# Patient Record
Sex: FEMALE | Race: WHITE | HISPANIC OR LATINO | Employment: FULL TIME | URBAN - METROPOLITAN AREA
[De-identification: names, ages, dates, MRNs, and addresses within clinical notes are randomized per-mention and may not be internally consistent; named-entity substitution may affect disease eponyms.]

---

## 2021-04-02 ENCOUNTER — OFFICE VISIT (OUTPATIENT)
Dept: URGENT CARE | Facility: CLINIC | Age: 21
End: 2021-04-02
Payer: MEDICAID

## 2021-04-02 VITALS
SYSTOLIC BLOOD PRESSURE: 108 MMHG | DIASTOLIC BLOOD PRESSURE: 74 MMHG | WEIGHT: 150 LBS | HEART RATE: 93 BPM | OXYGEN SATURATION: 98 % | BODY MASS INDEX: 27.6 KG/M2 | TEMPERATURE: 98 F | HEIGHT: 62 IN

## 2021-04-02 DIAGNOSIS — R59.1 LYMPHADENOPATHY: ICD-10-CM

## 2021-04-02 DIAGNOSIS — J02.9 SORE THROAT: Primary | ICD-10-CM

## 2021-04-02 LAB
CTP QC/QA: YES
S PYO RRNA THROAT QL PROBE: NEGATIVE

## 2021-04-02 PROCEDURE — 99204 PR OFFICE/OUTPT VISIT, NEW, LEVL IV, 45-59 MIN: ICD-10-PCS | Mod: S$GLB,,, | Performed by: EMERGENCY MEDICINE

## 2021-04-02 PROCEDURE — 87880 STREP A ASSAY W/OPTIC: CPT | Mod: QW,,, | Performed by: NURSE PRACTITIONER

## 2021-04-02 PROCEDURE — 87880 POCT RAPID STREP A: ICD-10-PCS | Mod: QW,,, | Performed by: NURSE PRACTITIONER

## 2021-04-02 PROCEDURE — 99204 OFFICE O/P NEW MOD 45 MIN: CPT | Mod: S$GLB,,, | Performed by: EMERGENCY MEDICINE

## 2021-04-02 RX ORDER — AMOXICILLIN AND CLAVULANATE POTASSIUM 875; 125 MG/1; MG/1
1 TABLET, FILM COATED ORAL EVERY 12 HOURS
Qty: 20 TABLET | Refills: 0 | Status: SHIPPED | OUTPATIENT
Start: 2021-04-02 | End: 2021-04-12

## 2022-01-19 ENCOUNTER — OFFICE VISIT (OUTPATIENT)
Dept: URGENT CARE | Facility: CLINIC | Age: 22
End: 2022-01-19
Payer: MEDICAID

## 2022-01-19 VITALS
TEMPERATURE: 98 F | HEIGHT: 62 IN | SYSTOLIC BLOOD PRESSURE: 109 MMHG | DIASTOLIC BLOOD PRESSURE: 76 MMHG | BODY MASS INDEX: 24.66 KG/M2 | HEART RATE: 79 BPM | WEIGHT: 134 LBS | RESPIRATION RATE: 16 BRPM | OXYGEN SATURATION: 98 %

## 2022-01-19 DIAGNOSIS — U07.1 COVID-19: ICD-10-CM

## 2022-01-19 DIAGNOSIS — J02.9 SORE THROAT: Primary | ICD-10-CM

## 2022-01-19 LAB
CTP QC/QA: YES
CTP QC/QA: YES
S PYO RRNA THROAT QL PROBE: NEGATIVE
SARS-COV-2 AG RESP QL IA.RAPID: POSITIVE

## 2022-01-19 PROCEDURE — 1159F PR MEDICATION LIST DOCUMENTED IN MEDICAL RECORD: ICD-10-PCS | Mod: CPTII,S$GLB,, | Performed by: STUDENT IN AN ORGANIZED HEALTH CARE EDUCATION/TRAINING PROGRAM

## 2022-01-19 PROCEDURE — 87811 SARS CORONAVIRUS 2 ANTIGEN POCT, MANUAL READ: ICD-10-PCS | Mod: S$GLB,,, | Performed by: STUDENT IN AN ORGANIZED HEALTH CARE EDUCATION/TRAINING PROGRAM

## 2022-01-19 PROCEDURE — 87880 STREP A ASSAY W/OPTIC: CPT | Mod: QW,,, | Performed by: STUDENT IN AN ORGANIZED HEALTH CARE EDUCATION/TRAINING PROGRAM

## 2022-01-19 PROCEDURE — 1159F MED LIST DOCD IN RCRD: CPT | Mod: CPTII,S$GLB,, | Performed by: STUDENT IN AN ORGANIZED HEALTH CARE EDUCATION/TRAINING PROGRAM

## 2022-01-19 PROCEDURE — 87880 POCT RAPID STREP A: ICD-10-PCS | Mod: QW,,, | Performed by: STUDENT IN AN ORGANIZED HEALTH CARE EDUCATION/TRAINING PROGRAM

## 2022-01-19 PROCEDURE — 3074F SYST BP LT 130 MM HG: CPT | Mod: CPTII,S$GLB,, | Performed by: STUDENT IN AN ORGANIZED HEALTH CARE EDUCATION/TRAINING PROGRAM

## 2022-01-19 PROCEDURE — 3074F PR MOST RECENT SYSTOLIC BLOOD PRESSURE < 130 MM HG: ICD-10-PCS | Mod: CPTII,S$GLB,, | Performed by: STUDENT IN AN ORGANIZED HEALTH CARE EDUCATION/TRAINING PROGRAM

## 2022-01-19 PROCEDURE — 99214 PR OFFICE/OUTPT VISIT, EST, LEVL IV, 30-39 MIN: ICD-10-PCS | Mod: S$GLB,,, | Performed by: STUDENT IN AN ORGANIZED HEALTH CARE EDUCATION/TRAINING PROGRAM

## 2022-01-19 PROCEDURE — 1160F RVW MEDS BY RX/DR IN RCRD: CPT | Mod: CPTII,S$GLB,, | Performed by: STUDENT IN AN ORGANIZED HEALTH CARE EDUCATION/TRAINING PROGRAM

## 2022-01-19 PROCEDURE — 87811 SARS-COV-2 COVID19 W/OPTIC: CPT | Mod: S$GLB,,, | Performed by: STUDENT IN AN ORGANIZED HEALTH CARE EDUCATION/TRAINING PROGRAM

## 2022-01-19 PROCEDURE — 3008F BODY MASS INDEX DOCD: CPT | Mod: CPTII,S$GLB,, | Performed by: STUDENT IN AN ORGANIZED HEALTH CARE EDUCATION/TRAINING PROGRAM

## 2022-01-19 PROCEDURE — 3078F PR MOST RECENT DIASTOLIC BLOOD PRESSURE < 80 MM HG: ICD-10-PCS | Mod: CPTII,S$GLB,, | Performed by: STUDENT IN AN ORGANIZED HEALTH CARE EDUCATION/TRAINING PROGRAM

## 2022-01-19 PROCEDURE — 3078F DIAST BP <80 MM HG: CPT | Mod: CPTII,S$GLB,, | Performed by: STUDENT IN AN ORGANIZED HEALTH CARE EDUCATION/TRAINING PROGRAM

## 2022-01-19 PROCEDURE — 1160F PR REVIEW ALL MEDS BY PRESCRIBER/CLIN PHARMACIST DOCUMENTED: ICD-10-PCS | Mod: CPTII,S$GLB,, | Performed by: STUDENT IN AN ORGANIZED HEALTH CARE EDUCATION/TRAINING PROGRAM

## 2022-01-19 PROCEDURE — 99214 OFFICE O/P EST MOD 30 MIN: CPT | Mod: S$GLB,,, | Performed by: STUDENT IN AN ORGANIZED HEALTH CARE EDUCATION/TRAINING PROGRAM

## 2022-01-19 PROCEDURE — 3008F PR BODY MASS INDEX (BMI) DOCUMENTED: ICD-10-PCS | Mod: CPTII,S$GLB,, | Performed by: STUDENT IN AN ORGANIZED HEALTH CARE EDUCATION/TRAINING PROGRAM

## 2022-01-19 RX ORDER — DEXTROMETHORPHAN HYDROBROMIDE, GUAIFENESIN, PHENYLEPHRINE HYDROCHLORIDE 17.5; 400; 1 MG/1; MG/1; MG/1
1 TABLET ORAL
Qty: 42 TABLET | Refills: 0 | Status: SHIPPED | OUTPATIENT
Start: 2022-01-19 | End: 2023-08-08 | Stop reason: CLARIF

## 2022-01-19 RX ORDER — BENZOCAINE/MENTHOL 15 MG-10MG
1 LOZENGE MUCOUS MEMBRANE
Qty: 60 LOZENGE | Refills: 0 | Status: SHIPPED | OUTPATIENT
Start: 2022-01-19 | End: 2023-08-08

## 2022-01-19 NOTE — PATIENT INSTRUCTIONS
Patient Education       COVID-19 Discharge Instructions   About this topic   Coronavirus disease 2019 is also known as COVID-19. It is a viral illness that infects the lungs. It is caused by a virus called SARS-associated coronavirus (SARS-CoV-2).  The signs of COVID-19 most often start a few days after you have been infected. In some people, it takes longer to show signs. Others never show signs of the infection. You may have a cough, fever, shaking chills and it may be hard to breathe. You may be very tired, have muscle aches, a headache or sore throat. Some people have an upset stomach or loose stools. Others lose their sense of smell or taste. You may not have these signs all the time and they may come and go while you are sick.  The virus spreads easily through droplets when you talk, sneeze, or cough. You can pass the virus to others when you are talking close together, singing, hugging, sharing food, or shaking hands. Doctors believe the germs also survive on surfaces like tables, door handles, and telephones. However, this is not a common way that COVID-19 spreads. Doctors believe you can also spread the infection even if you dont have any symptoms, but they do not know how that happens. This is why getting vaccinated is one of the best ways to keep you healthy and slow the spread of the virus.  Some people have a mild case of COVID-19 and are able to stay at home and away from others until they feel better. Others may need to be in the hospital if they are very sick. Some people with COVID-19 can have some symptoms for weeks or months. People with COVID-19 must isolate themselves. You can start to be around others when your doctor says it is safe to do so.       What care is needed at home?   · Ask your doctor what you need to do when you go home. Make sure you ask questions if you do not understand what the doctor says.  · Drink lots of water, juice, or broth to replace fluids lost from a fever.  · You  may use cool mist humidifiers to help ease congestion and coughing.  · Use 2 to 3 pillows to prop yourself up when you lie down to make it easier to breathe and sleep.  · Do not smoke and do not drink beer, wine, and mixed drinks (alcohol).  · To lower the chance of passing the infection to others, get a COVID-19 vaccine after your infection has resolved.  · If you have not been fully vaccinated:  ? Wear a mask over your mouth and nose if you are around others who are not sick. Cloth masks work best if they have more than one layer of fabric.  ? Wash your hands often.  ? Stay home in a separate room, if possible, away from others. Only go out to get medical care.  ? Use a separate bathroom if possible.  ? Do not make food for others.  What follow-up care is needed?   · Your doctor may ask you to make visits to the office to check on your progress. Be sure to keep these visits. Make sure you wear a mask at these visits.  · If you can, tell the staff you have COVID-19 ahead of time so they can take extra care to stop the disease from spreading.  · It may take a few weeks before your health returns to normal.  What drugs may be needed?   The doctor may order drugs to:  · Help with breathing  · Help with fever  · Help with swelling in your airways and lungs  · Control coughing  · Ease a sore throat  · Help a runny or stuffy nose  Will physical activity be limited?   You may have to limit your physical activity. Talk to your doctor about the right amount of activity for you. If you have been very sick with COVID-19, it can take some time to get your strength back.  Will there be any other care needed?   Doctors do not know how long you can pass the virus on to others after you are sick. This is why it is important to stay in a separate room, if possible, when you are sick. For now, doctors are giving general guidelines for you to follow after you have been sick. Before you go around other people, you should:  · Be fever  free for 24 hours without taking any drugs to lower the fever  · Have no symptoms of cough or shortness of breath  · Wait at least 10 days after first having symptoms or your first positive test, and you need to be symptom free as above. Some experts suggest waiting 20 days if you have had a more severe infection.  Talk with your doctor about getting a COVID-19 vaccine.  What problems could happen?   · Fluid loss. This is dehydration.  · Short-term or long-term lung damage  · Heart problems  · Death  When do I need to call the doctor?   · You are having so much trouble breathing that you can only say one or two words at a time.  · You need to sit upright at all times to be able to breathe and/or cannot lie down.  · You are very confused or cannot stay awake.  · Your lips or skin start to turn blue or grey.  · You think you might be having a medical emergency. Some examples of medical emergencies are:  ? Severe chest pain.  ? Not able to speak or move normally.  · You have trouble breathing when talking or sitting still.  · You have new shortness of breath.  · You become weak or dizzy.  · You have very dark urine or do not pass urine for more than 8 hours.  · You have new or worsening COVID-19 symptoms like:  ? Fever  ? Cough  ? Feeling very tired  ? Shaking chills  ? Headache  ? Trouble swallowing  ? Throwing up  ? Loose stools  ? Reddish purple spots on your fingers or toes  Teach Back: Helping You Understand   The Teach Back Method helps you understand the information we are giving you. After you talk with the staff, tell them in your own words what you learned. This helps to make sure the staff has described each thing clearly. It also helps to explain things that may have been confusing. Before going home, make sure you can do these:  · I can tell you about my condition.  · I can tell you what may help ease my breathing.  · I can tell you what I can do to help avoid passing the infection to others.  · I can tell  you what I will do if I have trouble breathing; feel sleepy or confused; or my fingertips, fingernails, skin, or lips are blue.  Where can I learn more?   Centers for Disease Control and Prevention  https://www.cdc.gov/coronavirus/2019-ncov/about/index.html   Centers for Disease Control and Prevention  https://www.cdc.gov/coronavirus/2019-ncov/hcp/disposition-in-home-patients.html   World Health Organization  https://www.who.int/news-room/q-a-detail/o-g-womjrcwatmgzf   Last Reviewed Date   2021-10-05  Consumer Information Use and Disclaimer   This information is not specific medical advice and does not replace information you receive from your health care provider. This is only a brief summary of general information. It does NOT include all information about conditions, illnesses, injuries, tests, procedures, treatments, therapies, discharge instructions or life-style choices that may apply to you. You must talk with your health care provider for complete information about your health and treatment options. This information should not be used to decide whether or not to accept your health care providers advice, instructions or recommendations. Only your health care provider has the knowledge and training to provide advice that is right for you.  Copyright   Copyright © 2021 [employer] and its affiliates and/or licensors. All rights reserved.

## 2022-01-19 NOTE — PROGRESS NOTES
"Subjective:       Patient ID: Smita Nguyen is a 21 y.o. female.    Vitals:  height is 5' 2" (1.575 m) and weight is 60.8 kg (134 lb). Her temperature is 98.4 °F (36.9 °C). Her blood pressure is 109/76 and her pulse is 79. Her respiration is 16 and oxygen saturation is 98%.     Chief Complaint: Sore Throat    Patient is a 21-year-old female who presents to clinic for COVID testing.  Patient states she is not vaccinated for COVID.  Patient unsure of COVID exposure.  Patient reports symptoms times approximately 2 days.  Patient states she is experiencing fatigue, fever with a temperature max of 100.0 F, nasal sinus congestion, sore throat, and generalized headaches.  Patient denies any acute dizziness, generalized body aches, chest pain or palpitations, shortness of breath or cough, abdominal pain, nausea or vomiting, or any diarrhea.  Patient states has taken over-the-counter medications with some relief of symptoms.    Sore Throat   This is a new problem. The current episode started in the past 7 days. The problem has been gradually worsening. Neither side of throat is experiencing more pain than the other. There has been no fever. Associated symptoms include congestion and headaches. Pertinent negatives include no abdominal pain, coughing, diarrhea, ear pain, shortness of breath, trouble swallowing or vomiting.       Constitution: Positive for fatigue and fever (Temperature max 100.0 F).   HENT: Positive for congestion and sore throat. Negative for ear pain and trouble swallowing.    Neck: neck negative.   Cardiovascular: Negative.  Negative for chest pain and palpitations.   Eyes: Negative.    Respiratory: Negative.  Negative for chest tightness, cough and shortness of breath.    Gastrointestinal: Negative.  Negative for abdominal pain, nausea, vomiting and diarrhea.   Endocrine: negative.   Genitourinary: Negative.    Musculoskeletal: Negative.  Negative for muscle ache.   Skin: Negative.  Negative for color " change, pale, rash and erythema.   Allergic/Immunologic: Negative.    Neurological: Positive for headaches. Negative for dizziness, light-headedness, passing out, disorientation and altered mental status.   Hematologic/Lymphatic: Negative.    Psychiatric/Behavioral: Negative.  Negative for altered mental status, disorientation and confusion.       Objective:      Physical Exam   Constitutional: She is oriented to person, place, and time. She appears well-developed and well-nourished. She is cooperative.  Non-toxic appearance. She does not appear ill. No distress.   HENT:   Head: Normocephalic and atraumatic.   Ears:   Right Ear: Hearing, tympanic membrane, external ear and ear canal normal.   Left Ear: Hearing, tympanic membrane, external ear and ear canal normal.   Nose: Congestion present. No mucosal edema, rhinorrhea or nasal deformity. No epistaxis. Right sinus exhibits no maxillary sinus tenderness and no frontal sinus tenderness. Left sinus exhibits no maxillary sinus tenderness and no frontal sinus tenderness.   Mouth/Throat: Uvula is midline and mucous membranes are normal. Mucous membranes are moist. No trismus in the jaw. Normal dentition. No uvula swelling. Posterior oropharyngeal erythema present. No oropharyngeal exudate. Oropharynx is clear.   Eyes: Conjunctivae and lids are normal. Pupils are equal, round, and reactive to light. Right eye exhibits no discharge. Left eye exhibits no discharge. No scleral icterus.   Neck: Trachea normal and phonation normal. Neck supple. No neck rigidity present.   Cardiovascular: Normal rate, regular rhythm, normal heart sounds, intact distal pulses and normal pulses.   Pulmonary/Chest: Effort normal and breath sounds normal. No respiratory distress. She has no wheezes. She has no rhonchi. She has no rales.   Abdominal: Normal appearance and bowel sounds are normal. She exhibits no distension. Soft. There is no abdominal tenderness.   Musculoskeletal: Normal range of  motion.         General: No deformity or edema. Normal range of motion.      Cervical back: She exhibits no tenderness.   Lymphadenopathy:     She has no cervical adenopathy.   Neurological: She is alert and oriented to person, place, and time. She exhibits normal muscle tone. Coordination normal.   Skin: Skin is warm, dry, intact, not diaphoretic, not pale and no rash. Capillary refill takes less than 2 seconds. No erythema   Psychiatric: She has a normal mood and affect. Her speech is normal and behavior is normal. Judgment and thought content normal.   Nursing note and vitals reviewed.        Assessment:       1. Sore throat    2. COVID-19          Plan:         Sore throat  -     POCT rapid strep A  -     SARS Coronavirus 2 Antigen, POCT Manual Read    COVID-19    Other orders  -     phenylephrine-DM-guaiFENesin (DECONEX DMX) 10-17.5-400 mg Tab; Take 1 tablet by mouth every 4 to 6 hours as needed (Congestion).  Dispense: 42 tablet; Refill: 0  -     benzocaine-menthoL (CHLORASEPTIC MAX) 15-10 mg Lozg; 1 lozenge by Mucous Membrane route every 2 (two) hours as needed (Sore throat).  Dispense: 60 lozenge; Refill: 0                 Labs:  COVID positive.  Rapid strep negative.  Quarantine as directed.  COVID recommendations provided.  Take medications as prescribed.  Follow-up with PCP in 1-2 days.  Return to clinic as needed.  To ED for any new or acutely worsening symptoms.  Patient in agreement with plan of care.

## 2023-08-05 ENCOUNTER — HOSPITAL ENCOUNTER (EMERGENCY)
Facility: HOSPITAL | Age: 23
Discharge: HOME | End: 2023-08-05
Attending: EMERGENCY MEDICINE
Payer: MEDICAID

## 2023-08-05 VITALS
RESPIRATION RATE: 16 BRPM | SYSTOLIC BLOOD PRESSURE: 121 MMHG | DIASTOLIC BLOOD PRESSURE: 63 MMHG | OXYGEN SATURATION: 100 % | HEART RATE: 76 BPM | TEMPERATURE: 98 F | HEIGHT: 62 IN | WEIGHT: 165 LBS | BODY MASS INDEX: 30.36 KG/M2

## 2023-08-05 DIAGNOSIS — N20.1 URETEROLITHIASIS: Primary | ICD-10-CM

## 2023-08-05 DIAGNOSIS — N23 RENAL COLIC: ICD-10-CM

## 2023-08-05 LAB
B-HCG UR QL: NEGATIVE
BACTERIA #/AREA URNS HPF: ABNORMAL /HPF
BILIRUB UR QL STRIP: NEGATIVE
CLARITY UR: CLEAR
COLOR UR: YELLOW
GLUCOSE UR QL STRIP: NEGATIVE
HGB UR QL STRIP: ABNORMAL
HYALINE CASTS #/AREA URNS LPF: 0 /LPF
KETONES UR QL STRIP: NEGATIVE
LEUKOCYTE ESTERASE UR QL STRIP: ABNORMAL
MICROSCOPIC COMMENT: ABNORMAL
NITRITE UR QL STRIP: NEGATIVE
PH UR STRIP: 6 [PH] (ref 5–8)
PROT UR QL STRIP: ABNORMAL
RBC #/AREA URNS HPF: 7 /HPF (ref 0–4)
SP GR UR STRIP: >=1.03 (ref 1–1.03)
SQUAMOUS #/AREA URNS HPF: 2 /HPF
URN SPEC COLLECT METH UR: ABNORMAL
UROBILINOGEN UR STRIP-ACNC: NEGATIVE EU/DL
WBC #/AREA URNS HPF: 2 /HPF (ref 0–5)

## 2023-08-05 PROCEDURE — 99285 EMERGENCY DEPT VISIT HI MDM: CPT | Mod: 25

## 2023-08-05 PROCEDURE — 74176 CT ABDOMEN PELVIS WITHOUT CONTRAST: ICD-10-PCS | Mod: 26,,, | Performed by: RADIOLOGY

## 2023-08-05 PROCEDURE — 96372 THER/PROPH/DIAG INJ SC/IM: CPT | Performed by: EMERGENCY MEDICINE

## 2023-08-05 PROCEDURE — 25000003 PHARM REV CODE 250: Performed by: EMERGENCY MEDICINE

## 2023-08-05 PROCEDURE — 81000 URINALYSIS NONAUTO W/SCOPE: CPT | Performed by: EMERGENCY MEDICINE

## 2023-08-05 PROCEDURE — 74176 CT ABD & PELVIS W/O CONTRAST: CPT | Mod: 26,,, | Performed by: RADIOLOGY

## 2023-08-05 PROCEDURE — 74176 CT ABD & PELVIS W/O CONTRAST: CPT | Mod: TC

## 2023-08-05 PROCEDURE — 63600175 PHARM REV CODE 636 W HCPCS: Mod: UD | Performed by: EMERGENCY MEDICINE

## 2023-08-05 PROCEDURE — 81025 URINE PREGNANCY TEST: CPT | Performed by: EMERGENCY MEDICINE

## 2023-08-05 RX ORDER — KETOROLAC TROMETHAMINE 30 MG/ML
60 INJECTION, SOLUTION INTRAMUSCULAR; INTRAVENOUS
Status: COMPLETED | OUTPATIENT
Start: 2023-08-05 | End: 2023-08-05

## 2023-08-05 RX ORDER — HYDROCODONE BITARTRATE AND ACETAMINOPHEN 10; 325 MG/1; MG/1
1 TABLET ORAL
Status: COMPLETED | OUTPATIENT
Start: 2023-08-05 | End: 2023-08-05

## 2023-08-05 RX ORDER — HYDROCODONE BITARTRATE AND ACETAMINOPHEN 10; 325 MG/1; MG/1
1 TABLET ORAL EVERY 6 HOURS PRN
Qty: 20 TABLET | Refills: 0 | Status: SHIPPED | OUTPATIENT
Start: 2023-08-05

## 2023-08-05 RX ADMIN — KETOROLAC TROMETHAMINE 60 MG: 30 INJECTION, SOLUTION INTRAMUSCULAR; INTRAVENOUS at 02:08

## 2023-08-05 RX ADMIN — HYDROCODONE BITARTRATE AND ACETAMINOPHEN 1 TABLET: 10; 325 TABLET ORAL at 03:08

## 2023-08-05 NOTE — ED PROVIDER NOTES
"Encounter Date: 8/5/2023       History     Chief Complaint   Patient presents with    Abdominal Pain     RLQ PAIN X 2 DAYS INCREASING SINCE 1700 YESTURDAY. +NAUSEA.  MIRENA IUD.      Pt here with RLQ pain for 2days but worse the past 7hrs. Pain feels like when she had appendicitis but she had her appendix removed. Denies being pregnant and has not had period in 3yrs since on birth control. She denies vag discharge and dysuria/hematuria. No hx of ovarian cyst. Pain 9/10.    The history is provided by the patient.   Abdominal Pain  The current episode started two days ago. The onset of the illness was gradual. The problem has been gradually worsening. The abdominal pain is located in the RLQ. The abdominal pain does not radiate. The severity of the abdominal pain is 9/10. The abdominal pain is relieved by nothing. The abdominal pain is exacerbated by movement. The other symptoms of the illness do not include fever, fatigue, jaundice, melena, nausea, vomiting, diarrhea, dysuria, hematemesis, hematochezia, vaginal discharge or vaginal bleeding.   Symptoms associated with the illness do not include chills, anorexia, diaphoresis, heartburn, constipation, urgency, hematuria, frequency or back pain.     Review of patient's allergies indicates:  No Known Allergies  Past Medical History:   Diagnosis Date    Anxiety     Thyroid condition     mom not sure but has "thyroid problem", was on med but hasn't refilled , not sure of name of med     History reviewed. No pertinent surgical history.  Family History   Problem Relation Age of Onset    Heart disease Mother     Asthma Brother      Social History     Tobacco Use    Smoking status: Every Day     Current packs/day: 0.00     Types: Vaping with nicotine    Smokeless tobacco: Never   Substance Use Topics    Alcohol use: No    Drug use: No     Review of Systems   Constitutional:  Negative for chills, diaphoresis, fatigue and fever.   Gastrointestinal:  Positive for abdominal pain. " Negative for anorexia, constipation, diarrhea, heartburn, hematemesis, hematochezia, jaundice, melena, nausea and vomiting.   Genitourinary:  Negative for dysuria, frequency, hematuria, urgency, vaginal bleeding and vaginal discharge.   Musculoskeletal:  Negative for back pain.   All other systems reviewed and are negative.      Physical Exam     Initial Vitals   BP Pulse Resp Temp SpO2   08/05/23 0219 08/05/23 0302 08/05/23 0302 08/05/23 0302 08/05/23 0219   113/79 78 18 98.4 °F (36.9 °C) 98 %      MAP       --                Physical Exam    Nursing note and vitals reviewed.  Constitutional: She appears well-developed and well-nourished. She is not diaphoretic. She appears distressed.   HENT:   Mouth/Throat: Oropharynx is clear and moist.   Eyes: No scleral icterus.   Cardiovascular:  Normal rate, regular rhythm, normal heart sounds and intact distal pulses.           Pulmonary/Chest: Breath sounds normal.   Abdominal: Abdomen is soft. Bowel sounds are normal. She exhibits no distension and no mass. There is abdominal tenderness.   Very mild RLQ ttp. No CVAT.  There is no rebound and no guarding.   Musculoskeletal:         General: No tenderness or edema. Normal range of motion.     Neurological: She is alert and oriented to person, place, and time. GCS score is 15. GCS eye subscore is 4. GCS verbal subscore is 5. GCS motor subscore is 6.   Skin: Skin is warm and dry. Capillary refill takes less than 2 seconds. No rash noted. No erythema. No pallor.   Psychiatric:   Anxious affect         ED Course   Procedures  Labs Reviewed   URINALYSIS, REFLEX TO URINE CULTURE - Abnormal; Notable for the following components:       Result Value    Specific Gravity, UA >=1.030 (*)     Protein, UA 1+ (*)     Occult Blood UA 2+ (*)     Leukocytes, UA Trace (*)     All other components within normal limits    Narrative:     Preferred Collection Type->Urine, Clean Catch  Specimen Source->Urine   URINALYSIS MICROSCOPIC - Abnormal;  Notable for the following components:    RBC, UA 7 (*)     All other components within normal limits    Narrative:     Preferred Collection Type->Urine, Clean Catch  Specimen Source->Urine   PREGNANCY TEST, URINE RAPID    Narrative:     Specimen Source->Urine          Imaging Results              CT Abdomen Pelvis  Without Contrast (In process)                      Medications   HYDROcodone-acetaminophen  mg per tablet 1 tablet (has no administration in time range)   ketorolac injection 60 mg (60 mg Intramuscular Given 8/5/23 0213)     Medical Decision Making:   Differential Diagnosis:   UTI, ovarian cyst, renal colic  Clinical Tests:   Lab Tests: Ordered and Reviewed  Radiological Study: Ordered and Reviewed  ED Management:  Pt presented with RLQ pain. Benign exam. UA with RBC. UPT neg. CT showed 10x5mm prox ureteral stone with mod hydro per Vrad[facility]. Pt given toradol with improvement in her pain. Referral sent for urology. Rx norco.              ED Course as of 08/05/23 0329   Sat Aug 05, 2023   0323 IMPRESSION:  There is moderate right hydronephrosis and proximal ureterectasis. There is a 10 x 5 mm right proximal ureteral stone. [DC]      ED Course User Index  [DC] Ron Bacon Jr., MD                 Clinical Impression:   Final diagnoses:  [N20.1] Ureterolithiasis (Primary)  [N23] Renal colic        ED Disposition Condition    Discharge Stable          ED Prescriptions       Medication Sig Dispense Start Date End Date Auth. Provider    HYDROcodone-acetaminophen (NORCO)  mg per tablet Take 1 tablet by mouth every 6 (six) hours as needed for Pain. 20 tablet 8/5/2023 -- Ron Bacon Jr., MD          Follow-up Information    None          Ron Bacon Jr., MD  08/05/23 0329

## 2023-08-07 DIAGNOSIS — N20.1 RIGHT URETERAL CALCULUS: Primary | ICD-10-CM

## 2023-08-07 DIAGNOSIS — N20.1 RIGHT URETERAL STONE: ICD-10-CM

## 2023-08-07 NOTE — PROGRESS NOTES
Pt with 1.1cm R prox ureteral calculus. Case request placed for OR 8/11/23 for R URS/LL/stent. Will see tomorrow for H&P/consent.

## 2023-08-07 NOTE — PROGRESS NOTES
Subjective:       Smita Nguyen is a 22 y.o. female who is a new patient who was self-referred  for evaluation of R ureteral stone.      She was seen in ER recently with R pelvic pain/cramps. CT showed large 1.1cm R prox ureteral stone with moderate hydronephrosis. Prior h/o stones a few years ago 2020, no prior surgery - believes she passed these. Now with urinary frequency and sensation of CANDY. Notes dark urine. UA with RBCs. No UCx. No fevers/dysuria.     PVR (bladder scan) today - 43cc          The following portions of the patient's history were reviewed and updated as appropriate: allergies, current medications, past family history, past medical history, past social history, past surgical history and problem list.    Review of Systems  12 point review of systems completed. Pertinent positive and negatives listed in HPI        Objective:    Vitals: Wt 68 kg (149 lb 14.6 oz)   LMP  (LMP Unknown)   BMI 27.42 kg/m²     Physical Exam   General: well developed, well nourished in no acute distress  Head: normocephalic, atraumatic  Neck: supple, trachea midline, no obvious enlargement of thyroid  HEENT: EOMI, mucus membranes moist, sclera anicteric, no hearing impairment  Lungs: symmetric expansion, non-labored breathing  Skin: no rashes or lesions  Neuro: alert and oriented x 3, no gross deficits  Psych: normal judgment and insight, normal mood/affect and non-anxious  Genitourinary: deferred      Lab Review   Urine analysis today in clinic shows positive for red blood cells 250, 30 protein     Lab Results   Component Value Date    WBC 5.4 08/03/2012    HGB 9.4 (L) 08/03/2012    HCT 27.9 (L) 08/03/2012    MCV 90.7 (H) 08/03/2012     08/03/2012     Lab Results   Component Value Date    CREATININE 0.6 08/03/2012    BUN 13 08/03/2012         Imaging  Images and reports were personally reviewed by me and discussed with patient  CT reviewed       Assessment/Plan:      1. Right ureteral calculus    - Large 1.1cm R  prox ureteral stone. Unlikely to pass spontaneously.    - Recommend R URS/LL/stent.  May need >1 procedure. Discussed r/b/a.    - UCx today   - Discussed stone prevention   - Discussed red flags to go to ER: fever, inability to tolerate PO, pain not controlled by medications.   - OR 8/11/23     2. Sensation of pressure in bladder area    - Suspect 2/2 ureteral stone   - PVR low   - UCx today         Follow up in 3 weeks

## 2023-08-08 ENCOUNTER — HOSPITAL ENCOUNTER (OUTPATIENT)
Dept: PREADMISSION TESTING | Facility: HOSPITAL | Age: 23
Discharge: HOME | End: 2023-08-08
Attending: UROLOGY
Payer: MEDICAID

## 2023-08-08 ENCOUNTER — ANESTHESIA EVENT (OUTPATIENT)
Dept: SURGERY | Facility: HOSPITAL | Age: 23
End: 2023-08-08
Payer: MEDICAID

## 2023-08-08 ENCOUNTER — OFFICE VISIT (OUTPATIENT)
Dept: UROLOGY | Facility: CLINIC | Age: 23
End: 2023-08-08
Payer: MEDICAID

## 2023-08-08 VITALS
BODY MASS INDEX: 28.72 KG/M2 | TEMPERATURE: 98 F | SYSTOLIC BLOOD PRESSURE: 93 MMHG | OXYGEN SATURATION: 100 % | RESPIRATION RATE: 18 BRPM | WEIGHT: 156.06 LBS | HEIGHT: 62 IN | HEART RATE: 57 BPM | DIASTOLIC BLOOD PRESSURE: 56 MMHG

## 2023-08-08 VITALS — WEIGHT: 149.94 LBS | BODY MASS INDEX: 27.42 KG/M2

## 2023-08-08 DIAGNOSIS — N20.1 RIGHT URETERAL CALCULUS: Primary | ICD-10-CM

## 2023-08-08 DIAGNOSIS — R39.89 SENSATION OF PRESSURE IN BLADDER AREA: ICD-10-CM

## 2023-08-08 DIAGNOSIS — N23 RENAL COLIC: ICD-10-CM

## 2023-08-08 DIAGNOSIS — Z01.818 PREOPERATIVE TESTING: Primary | ICD-10-CM

## 2023-08-08 LAB
ANION GAP SERPL CALC-SCNC: 6 MMOL/L (ref 8–16)
BASOPHILS # BLD AUTO: 0.03 K/UL (ref 0–0.2)
BASOPHILS NFR BLD: 0.4 % (ref 0–1.9)
BUN SERPL-MCNC: 10 MG/DL (ref 6–20)
CALCIUM SERPL-MCNC: 9.1 MG/DL (ref 8.7–10.5)
CHLORIDE SERPL-SCNC: 108 MMOL/L (ref 95–110)
CO2 SERPL-SCNC: 25 MMOL/L (ref 23–29)
CREAT SERPL-MCNC: 0.8 MG/DL (ref 0.5–1.4)
DIFFERENTIAL METHOD: NORMAL
EOSINOPHIL # BLD AUTO: 0.2 K/UL (ref 0–0.5)
EOSINOPHIL NFR BLD: 2.1 % (ref 0–8)
ERYTHROCYTE [DISTWIDTH] IN BLOOD BY AUTOMATED COUNT: 11.9 % (ref 11.5–14.5)
EST. GFR  (NO RACE VARIABLE): >60 ML/MIN/1.73 M^2
GLUCOSE SERPL-MCNC: 90 MG/DL (ref 70–110)
HCT VFR BLD AUTO: 39.6 % (ref 37–48.5)
HGB BLD-MCNC: 12.9 G/DL (ref 12–16)
IMM GRANULOCYTES # BLD AUTO: 0.01 K/UL (ref 0–0.04)
IMM GRANULOCYTES NFR BLD AUTO: 0.1 % (ref 0–0.5)
LYMPHOCYTES # BLD AUTO: 2.6 K/UL (ref 1–4.8)
LYMPHOCYTES NFR BLD: 36.2 % (ref 18–48)
MCH RBC QN AUTO: 29.4 PG (ref 27–31)
MCHC RBC AUTO-ENTMCNC: 32.6 G/DL (ref 32–36)
MCV RBC AUTO: 90 FL (ref 82–98)
MONOCYTES # BLD AUTO: 0.5 K/UL (ref 0.3–1)
MONOCYTES NFR BLD: 7.4 % (ref 4–15)
NEUTROPHILS # BLD AUTO: 3.8 K/UL (ref 1.8–7.7)
NEUTROPHILS NFR BLD: 53.8 % (ref 38–73)
NRBC BLD-RTO: 0 /100 WBC
PLATELET # BLD AUTO: 269 K/UL (ref 150–450)
PMV BLD AUTO: 9.5 FL (ref 9.2–12.9)
POTASSIUM SERPL-SCNC: 4.7 MMOL/L (ref 3.5–5.1)
RBC # BLD AUTO: 4.39 M/UL (ref 4–5.4)
SODIUM SERPL-SCNC: 139 MMOL/L (ref 136–145)
WBC # BLD AUTO: 7.13 K/UL (ref 3.9–12.7)

## 2023-08-08 PROCEDURE — 3008F BODY MASS INDEX DOCD: CPT | Mod: CPTII,,, | Performed by: UROLOGY

## 2023-08-08 PROCEDURE — 99213 OFFICE O/P EST LOW 20 MIN: CPT | Mod: PBBFAC | Performed by: UROLOGY

## 2023-08-08 PROCEDURE — 99999 PR PBB SHADOW E&M-EST. PATIENT-LVL III: CPT | Mod: PBBFAC,,, | Performed by: UROLOGY

## 2023-08-08 PROCEDURE — 1159F PR MEDICATION LIST DOCUMENTED IN MEDICAL RECORD: ICD-10-PCS | Mod: CPTII,,, | Performed by: UROLOGY

## 2023-08-08 PROCEDURE — 80048 BASIC METABOLIC PNL TOTAL CA: CPT | Performed by: UROLOGY

## 2023-08-08 PROCEDURE — 99204 PR OFFICE/OUTPT VISIT, NEW, LEVL IV, 45-59 MIN: ICD-10-PCS | Mod: S$PBB,,, | Performed by: UROLOGY

## 2023-08-08 PROCEDURE — 3008F PR BODY MASS INDEX (BMI) DOCUMENTED: ICD-10-PCS | Mod: CPTII,,, | Performed by: UROLOGY

## 2023-08-08 PROCEDURE — 85025 COMPLETE CBC W/AUTO DIFF WBC: CPT | Performed by: UROLOGY

## 2023-08-08 PROCEDURE — 1160F PR REVIEW ALL MEDS BY PRESCRIBER/CLIN PHARMACIST DOCUMENTED: ICD-10-PCS | Mod: CPTII,,, | Performed by: UROLOGY

## 2023-08-08 PROCEDURE — 1160F RVW MEDS BY RX/DR IN RCRD: CPT | Mod: CPTII,,, | Performed by: UROLOGY

## 2023-08-08 PROCEDURE — 1159F MED LIST DOCD IN RCRD: CPT | Mod: CPTII,,, | Performed by: UROLOGY

## 2023-08-08 PROCEDURE — 99204 OFFICE O/P NEW MOD 45 MIN: CPT | Mod: S$PBB,,, | Performed by: UROLOGY

## 2023-08-08 PROCEDURE — 36415 COLL VENOUS BLD VENIPUNCTURE: CPT | Performed by: UROLOGY

## 2023-08-08 PROCEDURE — 99999 PR PBB SHADOW E&M-EST. PATIENT-LVL III: ICD-10-PCS | Mod: PBBFAC,,, | Performed by: UROLOGY

## 2023-08-08 PROCEDURE — 87086 URINE CULTURE/COLONY COUNT: CPT | Performed by: UROLOGY

## 2023-08-08 NOTE — PATIENT INSTRUCTIONS
"1. Eat fewer high-oxalate foods.  The first suggestion is the most obvious. The more oxalate that is absorbed from your digestive tract, the more oxalate in your urine. High-oxalate foods to limit, if you eat them, are:   Spinach   Bran flakes   Rhubarb   Beets   Potato chips   French fries   Nuts and nut butters   You do not need to cut out other healthy foods that provide some oxalate. In fact, oxalate is practically unavoidable, because most plant foods have some. Often a combination of calcium from foods or beverages with meals and fewer high-oxalate foods is required.    2. Increase the amount of calcium in your diet.  Low amounts of calcium in your diet will increase your chances of forming calcium oxalate kidney stones. Many people are afraid to eat calcium because of the name "calcium oxalate stones." However, calcium binds oxalate in the intestines. A diet rich in calcium helps reduce the amount of oxalate being absorbed by your body, so stones are less likely to form. Eat calcium rich foods and beverages every day (2 to 3 servings) from dairy foods or other calcium-rich foods.   Also, eating high calcium foods at the same time as high oxalate food is helpful; for example have low fat cheese with a spinach salad or yogurt with berries. If you take a calcium supplement, calcium citrate is the preferred form.    3. Limit the vitamin C content of your diet.  Oxalate is produced as an end product of Vitamin C (ascorbic acid) metabolism. Large doses of Vitamin C may increase the amount of oxalate in your urine, increasing the risk of kidney stone formation. If you are taking a supplement, do not take more than 500 mg of Vitamin C daily.    4. Drink the right amount of fluids every day.  It is very important to drink plenty of liquids. Your goal should be 10-12 glasses a day. At least 5-6 glasses should be water. You may also want to consider drinking lemonade. Research suggests that lemonade may be helpful in " reducing the risk of calcium oxalate stone formation.    5. Eating the right amount of protein daily.  Eating large amounts of protein may increase the risk of kidney stone formation. Your daily protein needs can usually be met with 2-3 servings a day, or 4 to 6 ounces. Eating more than this if you are at risk at kidney stones is unnecessary.    6. Reduce the amount of sodium in your diet.  Reduce the amount of sodium in your diet to 2-3 grams per day. Limit eating processed foods such as hot dogs, deli meats, sausage, canned products, dry soup mixes, sauerkraut, pickles, and various convenience mixes.

## 2023-08-08 NOTE — DISCHARGE INSTRUCTIONS
Before 7 AM, enter through the Emergency Entrance..   After 7 AM enter through the Main Entrance.      Your procedure  is scheduled for _8/11/2023_________.    Call [phone] between 2pm and 5pm on __8/10/2023_____to find out your arrival time for the day of surgery.    You may have two visitors.  No children under 12 years old.     You will be going to the Same Day Surgery Unit on the 2nd floor of the hospital.    Important instructions:  Do not eat anything after midnight.  You may have plain water, non carbonated.  You may also have Gatorade or Powerade after midnight.    Stop all fluids 2 hours before your surgery.    It is okay to brush your teeth.  Do not have gum, candy or mints.    SEE MEDICATION SHEET.   TAKE MEDICATIONS AS DIRECTED WITH SIPS OF WATER.ar fresh clean clothing after each shower.    No shaving of procedural area at least 4-5 days before surgery due to increased risk of skin irritation and/or possible infection.    Female patients may be asked for a urine specimen on the morning of the surgery.  Please check with your nurse before using the restroom.    Contact lenses and removable denture work may not be worn during your procedure.    You may wear deodorant only. If you are having breast surgery, do not wear deodorant on the operative side.    Do not wear powder, body lotion, perfume/cologne or make-up.    Do not wear any jewelry or have any metal on your body.    You will be asked to remove any dentures or partials for the procedure.    If you are going home on the same day of surgery, you must arrange for a family member or a friend to drive you home.  Public transportation is prohibited.  You will not be able to drive home if you were given anesthesia or sedation.    Patients who want to have their Post-op prescriptions filled from our in-house [facility], bring a Credit/Debit Card or cash with you. A co-pay may be required.  The pharmacy closes at 5:30 pm.    Wear loose  fitting clothes allowing for bandages.    Please leave money and valuables home.      You may bring your cell phone.    Call the doctor if fever or illness should occur before your surgery.    Call [phone] to contact us here if needed.                            CLOTHES ON DAY OF SURGERY    SHOULDER surgery:  you must have a very oversized shirt.  Very, Very large.  You will probably have a large sling on with your arm strapped to your chest.  You will not be able to put the arm of the operated shoulder into a sleeve.  You can put the arm of the un-operated shoulder into the sleeve, but the shirt will need to be draped over the operated shoulder.       ARM or HAND surgery:  make sure that your sleeves are large and loose enough to pass over large dressings or cast.      BREAST or UNDERARM surgery:  wear a loose, button down shirt so that you can dress without raising your arms over your head.    ABDOMINAL surgery:  wear loose, comfortable clothing.  Nothing tight around the abdomen.  NO JEANS    PENIS or SCROTAL surgery:  loose comfortable clothing.  Large sweat pants, pajama pants or a robe.  ABSOLUTELY NO JEANS      LEG or FOOT surgery:  wear large loose pants that are able to pass over any large dressings or casts.  You could also wear loose shorts or a skirt.

## 2023-08-10 LAB — BACTERIA UR CULT: NORMAL

## 2023-08-11 ENCOUNTER — HOSPITAL ENCOUNTER (OUTPATIENT)
Facility: HOSPITAL | Age: 23
Discharge: HOME | End: 2023-08-11
Attending: UROLOGY | Admitting: UROLOGY
Payer: MEDICAID

## 2023-08-11 ENCOUNTER — ANESTHESIA (OUTPATIENT)
Dept: SURGERY | Facility: HOSPITAL | Age: 23
End: 2023-08-11
Payer: MEDICAID

## 2023-08-11 VITALS
HEART RATE: 56 BPM | BODY MASS INDEX: 28.54 KG/M2 | WEIGHT: 156.06 LBS | DIASTOLIC BLOOD PRESSURE: 55 MMHG | RESPIRATION RATE: 15 BRPM | SYSTOLIC BLOOD PRESSURE: 97 MMHG | OXYGEN SATURATION: 98 % | TEMPERATURE: 98 F

## 2023-08-11 DIAGNOSIS — N20.1 RIGHT URETERAL CALCULUS: Primary | ICD-10-CM

## 2023-08-11 DIAGNOSIS — N20.1 RIGHT URETERAL STONE: ICD-10-CM

## 2023-08-11 LAB
B-HCG UR QL: NEGATIVE
CTP QC/QA: YES

## 2023-08-11 PROCEDURE — 37000009 HC ANESTHESIA EA ADD 15 MINS: Performed by: UROLOGY

## 2023-08-11 PROCEDURE — 74420 UROGRAPHY RTRGR +-KUB: CPT | Mod: 26,,, | Performed by: UROLOGY

## 2023-08-11 PROCEDURE — 71000015 HC POSTOP RECOV 1ST HR: Performed by: UROLOGY

## 2023-08-11 PROCEDURE — 88300 SURGICAL PATH GROSS: CPT | Performed by: PATHOLOGY

## 2023-08-11 PROCEDURE — 37000008 HC ANESTHESIA 1ST 15 MINUTES: Performed by: UROLOGY

## 2023-08-11 PROCEDURE — 88300 PR  SURG PATH,GROSS,LEVEL I: ICD-10-PCS | Mod: 26,,, | Performed by: PATHOLOGY

## 2023-08-11 PROCEDURE — 63600175 PHARM REV CODE 636 W HCPCS: Performed by: STUDENT IN AN ORGANIZED HEALTH CARE EDUCATION/TRAINING PROGRAM

## 2023-08-11 PROCEDURE — C1773 RET DEV, INSERTABLE: HCPCS | Performed by: UROLOGY

## 2023-08-11 PROCEDURE — 63600175 PHARM REV CODE 636 W HCPCS: Performed by: UROLOGY

## 2023-08-11 PROCEDURE — 25000003 PHARM REV CODE 250: Performed by: ANESTHESIOLOGY

## 2023-08-11 PROCEDURE — D9220A PRA ANESTHESIA: ICD-10-PCS | Mod: CRNA,,, | Performed by: STUDENT IN AN ORGANIZED HEALTH CARE EDUCATION/TRAINING PROGRAM

## 2023-08-11 PROCEDURE — 27201423 OPTIME MED/SURG SUP & DEVICES STERILE SUPPLY: Performed by: UROLOGY

## 2023-08-11 PROCEDURE — C2617 STENT, NON-COR, TEM W/O DEL: HCPCS | Performed by: UROLOGY

## 2023-08-11 PROCEDURE — D9220A PRA ANESTHESIA: ICD-10-PCS | Mod: ANES,,, | Performed by: ANESTHESIOLOGY

## 2023-08-11 PROCEDURE — C1769 GUIDE WIRE: HCPCS | Performed by: UROLOGY

## 2023-08-11 PROCEDURE — C1894 INTRO/SHEATH, NON-LASER: HCPCS | Performed by: UROLOGY

## 2023-08-11 PROCEDURE — D9220A PRA ANESTHESIA: Mod: CRNA,,, | Performed by: STUDENT IN AN ORGANIZED HEALTH CARE EDUCATION/TRAINING PROGRAM

## 2023-08-11 PROCEDURE — 36000706: Performed by: UROLOGY

## 2023-08-11 PROCEDURE — 81025 URINE PREGNANCY TEST: CPT | Performed by: UROLOGY

## 2023-08-11 PROCEDURE — 63600175 PHARM REV CODE 636 W HCPCS: Performed by: ANESTHESIOLOGY

## 2023-08-11 PROCEDURE — 74420 PR  X-RAY RETROGRADE PYELOGRAM: ICD-10-PCS | Mod: 26,,, | Performed by: UROLOGY

## 2023-08-11 PROCEDURE — 25000003 PHARM REV CODE 250: Performed by: STUDENT IN AN ORGANIZED HEALTH CARE EDUCATION/TRAINING PROGRAM

## 2023-08-11 PROCEDURE — 71000016 HC POSTOP RECOV ADDL HR: Performed by: UROLOGY

## 2023-08-11 PROCEDURE — 36000707: Performed by: UROLOGY

## 2023-08-11 PROCEDURE — 25500020 PHARM REV CODE 255: Performed by: UROLOGY

## 2023-08-11 PROCEDURE — 52356 PR CYSTO/URETERO W/LITHOTRIPSY: ICD-10-PCS | Mod: RT,,, | Performed by: UROLOGY

## 2023-08-11 PROCEDURE — 71000033 HC RECOVERY, INTIAL HOUR: Performed by: UROLOGY

## 2023-08-11 PROCEDURE — 63600175 PHARM REV CODE 636 W HCPCS: Mod: UD | Performed by: NURSE ANESTHETIST, CERTIFIED REGISTERED

## 2023-08-11 PROCEDURE — 52356 CYSTO/URETERO W/LITHOTRIPSY: CPT | Mod: RT,,, | Performed by: UROLOGY

## 2023-08-11 PROCEDURE — 88300 SURGICAL PATH GROSS: CPT | Mod: 26,,, | Performed by: PATHOLOGY

## 2023-08-11 PROCEDURE — D9220A PRA ANESTHESIA: Mod: ANES,,, | Performed by: ANESTHESIOLOGY

## 2023-08-11 PROCEDURE — 63600175 PHARM REV CODE 636 W HCPCS: Mod: UD | Performed by: ANESTHESIOLOGY

## 2023-08-11 PROCEDURE — 82365 CALCULUS SPECTROSCOPY: CPT | Performed by: UROLOGY

## 2023-08-11 DEVICE — STENT URET PERCUFLEX 4.8F 24CM: Type: IMPLANTABLE DEVICE | Site: URETER | Status: FUNCTIONAL

## 2023-08-11 RX ORDER — HYDROCODONE BITARTRATE AND ACETAMINOPHEN 5; 325 MG/1; MG/1
1 TABLET ORAL EVERY 4 HOURS PRN
Status: DISCONTINUED | OUTPATIENT
Start: 2023-08-11 | End: 2023-08-11 | Stop reason: HOSPADM

## 2023-08-11 RX ORDER — CEFAZOLIN SODIUM 2 G/50ML
2 SOLUTION INTRAVENOUS
Status: COMPLETED | OUTPATIENT
Start: 2023-08-11 | End: 2023-08-11

## 2023-08-11 RX ORDER — ONDANSETRON 2 MG/ML
INJECTION INTRAMUSCULAR; INTRAVENOUS
Status: DISCONTINUED | OUTPATIENT
Start: 2023-08-11 | End: 2023-08-11

## 2023-08-11 RX ORDER — PROPOFOL 10 MG/ML
VIAL (ML) INTRAVENOUS
Status: DISCONTINUED | OUTPATIENT
Start: 2023-08-11 | End: 2023-08-11

## 2023-08-11 RX ORDER — HYDROMORPHONE HYDROCHLORIDE 2 MG/ML
0.2 INJECTION, SOLUTION INTRAMUSCULAR; INTRAVENOUS; SUBCUTANEOUS EVERY 5 MIN PRN
Status: DISCONTINUED | OUTPATIENT
Start: 2023-08-11 | End: 2023-08-11 | Stop reason: HOSPADM

## 2023-08-11 RX ORDER — CEPHALEXIN 500 MG/1
500 CAPSULE ORAL EVERY 12 HOURS
Qty: 4 CAPSULE | Refills: 0 | Status: SHIPPED | OUTPATIENT
Start: 2023-08-11

## 2023-08-11 RX ORDER — LIDOCAINE HYDROCHLORIDE 10 MG/ML
1 INJECTION, SOLUTION EPIDURAL; INFILTRATION; INTRACAUDAL; PERINEURAL ONCE
Status: COMPLETED | OUTPATIENT
Start: 2023-08-11 | End: 2023-08-11

## 2023-08-11 RX ORDER — MIDAZOLAM HYDROCHLORIDE 1 MG/ML
INJECTION INTRAMUSCULAR; INTRAVENOUS
Status: DISCONTINUED | OUTPATIENT
Start: 2023-08-11 | End: 2023-08-11

## 2023-08-11 RX ORDER — ACETAMINOPHEN 500 MG
1000 TABLET ORAL
Status: COMPLETED | OUTPATIENT
Start: 2023-08-11 | End: 2023-08-11

## 2023-08-11 RX ORDER — ACETAMINOPHEN 325 MG/1
650 TABLET ORAL EVERY 4 HOURS PRN
Status: DISCONTINUED | OUTPATIENT
Start: 2023-08-11 | End: 2023-08-11 | Stop reason: HOSPADM

## 2023-08-11 RX ORDER — SODIUM CHLORIDE 0.9 % (FLUSH) 0.9 %
10 SYRINGE (ML) INJECTION
Status: DISCONTINUED | OUTPATIENT
Start: 2023-08-11 | End: 2023-08-11 | Stop reason: HOSPADM

## 2023-08-11 RX ORDER — PHENAZOPYRIDINE HYDROCHLORIDE 100 MG/1
200 TABLET, FILM COATED ORAL
Qty: 18 TABLET | Refills: 0 | Status: SHIPPED | OUTPATIENT
Start: 2023-08-11

## 2023-08-11 RX ORDER — FENTANYL CITRATE 50 UG/ML
INJECTION, SOLUTION INTRAMUSCULAR; INTRAVENOUS
Status: DISCONTINUED | OUTPATIENT
Start: 2023-08-11 | End: 2023-08-11

## 2023-08-11 RX ORDER — HYDROCODONE BITARTRATE AND ACETAMINOPHEN 5; 325 MG/1; MG/1
1 TABLET ORAL EVERY 6 HOURS PRN
Qty: 8 TABLET | Refills: 0 | Status: SHIPPED | OUTPATIENT
Start: 2023-08-11

## 2023-08-11 RX ORDER — SODIUM CHLORIDE, SODIUM LACTATE, POTASSIUM CHLORIDE, CALCIUM CHLORIDE 600; 310; 30; 20 MG/100ML; MG/100ML; MG/100ML; MG/100ML
INJECTION, SOLUTION INTRAVENOUS CONTINUOUS
Status: DISCONTINUED | OUTPATIENT
Start: 2023-08-11 | End: 2023-08-11 | Stop reason: HOSPADM

## 2023-08-11 RX ORDER — DEXAMETHASONE SODIUM PHOSPHATE 4 MG/ML
INJECTION, SOLUTION INTRA-ARTICULAR; INTRALESIONAL; INTRAMUSCULAR; INTRAVENOUS; SOFT TISSUE
Status: DISCONTINUED | OUTPATIENT
Start: 2023-08-11 | End: 2023-08-11

## 2023-08-11 RX ORDER — PROCHLORPERAZINE EDISYLATE 5 MG/ML
INJECTION INTRAMUSCULAR; INTRAVENOUS
Status: DISCONTINUED | OUTPATIENT
Start: 2023-08-11 | End: 2023-08-11

## 2023-08-11 RX ORDER — ROCURONIUM BROMIDE 10 MG/ML
INJECTION, SOLUTION INTRAVENOUS
Status: DISCONTINUED | OUTPATIENT
Start: 2023-08-11 | End: 2023-08-11

## 2023-08-11 RX ORDER — ACETAMINOPHEN 500 MG
1000 TABLET ORAL
Status: DISCONTINUED | OUTPATIENT
Start: 2023-08-12 | End: 2023-08-11

## 2023-08-11 RX ORDER — LIDOCAINE HYDROCHLORIDE 20 MG/ML
INJECTION INTRAVENOUS
Status: DISCONTINUED | OUTPATIENT
Start: 2023-08-11 | End: 2023-08-11

## 2023-08-11 RX ORDER — PHENYLEPHRINE HYDROCHLORIDE 10 MG/ML
INJECTION INTRAVENOUS
Status: DISCONTINUED | OUTPATIENT
Start: 2023-08-11 | End: 2023-08-11

## 2023-08-11 RX ADMIN — PHENYLEPHRINE HYDROCHLORIDE 50 MCG: 10 INJECTION INTRAVENOUS at 09:08

## 2023-08-11 RX ADMIN — PHENYLEPHRINE HYDROCHLORIDE 100 MCG: 10 INJECTION INTRAVENOUS at 09:08

## 2023-08-11 RX ADMIN — FENTANYL CITRATE 100 MCG: 50 INJECTION, SOLUTION INTRAMUSCULAR; INTRAVENOUS at 09:08

## 2023-08-11 RX ADMIN — SUGAMMADEX 200 MG: 100 INJECTION, SOLUTION INTRAVENOUS at 10:08

## 2023-08-11 RX ADMIN — PROCHLORPERAZINE EDISYLATE 5 MG: 5 INJECTION, SOLUTION INTRAMUSCULAR; INTRAVENOUS at 09:08

## 2023-08-11 RX ADMIN — HYDROMORPHONE HYDROCHLORIDE 0.2 MG: 2 INJECTION INTRAMUSCULAR; INTRAVENOUS; SUBCUTANEOUS at 11:08

## 2023-08-11 RX ADMIN — GLYCOPYRROLATE 0.1 MG: 0.2 INJECTION, SOLUTION INTRAMUSCULAR; INTRAVITREAL at 10:08

## 2023-08-11 RX ADMIN — PROPOFOL 150 MG: 10 INJECTION, EMULSION INTRAVENOUS at 09:08

## 2023-08-11 RX ADMIN — SODIUM CHLORIDE, POTASSIUM CHLORIDE, SODIUM LACTATE AND CALCIUM CHLORIDE: 600; 310; 30; 20 INJECTION, SOLUTION INTRAVENOUS at 08:08

## 2023-08-11 RX ADMIN — ONDANSETRON 4 MG: 2 INJECTION, SOLUTION INTRAMUSCULAR; INTRAVENOUS at 09:08

## 2023-08-11 RX ADMIN — DEXAMETHASONE SODIUM PHOSPHATE 4 MG: 4 INJECTION, SOLUTION INTRAMUSCULAR; INTRAVENOUS at 09:08

## 2023-08-11 RX ADMIN — CEFAZOLIN SODIUM 2 G: 2 SOLUTION INTRAVENOUS at 09:08

## 2023-08-11 RX ADMIN — MIDAZOLAM HYDROCHLORIDE 1 MG: 1 INJECTION INTRAMUSCULAR; INTRAVENOUS at 08:08

## 2023-08-11 RX ADMIN — PHENYLEPHRINE HYDROCHLORIDE 50 MCG: 10 INJECTION INTRAVENOUS at 10:08

## 2023-08-11 RX ADMIN — ROCURONIUM BROMIDE 20 MG: 10 INJECTION, SOLUTION INTRAVENOUS at 10:08

## 2023-08-11 RX ADMIN — PHENYLEPHRINE HYDROCHLORIDE 100 MCG: 10 INJECTION INTRAVENOUS at 10:08

## 2023-08-11 RX ADMIN — LIDOCAINE HYDROCHLORIDE 100 MG: 20 INJECTION, SOLUTION INTRAVENOUS at 09:08

## 2023-08-11 RX ADMIN — MIDAZOLAM HYDROCHLORIDE 2 MG: 1 INJECTION INTRAMUSCULAR; INTRAVENOUS at 08:08

## 2023-08-11 RX ADMIN — MIDAZOLAM HYDROCHLORIDE 1 MG: 1 INJECTION INTRAMUSCULAR; INTRAVENOUS at 09:08

## 2023-08-11 RX ADMIN — LIDOCAINE HYDROCHLORIDE 10 MG: 10 INJECTION, SOLUTION EPIDURAL; INFILTRATION; INTRACAUDAL at 07:08

## 2023-08-11 RX ADMIN — SODIUM CHLORIDE, POTASSIUM CHLORIDE, SODIUM LACTATE AND CALCIUM CHLORIDE: 600; 310; 30; 20 INJECTION, SOLUTION INTRAVENOUS at 10:08

## 2023-08-11 RX ADMIN — ACETAMINOPHEN 1000 MG: 500 TABLET ORAL at 08:08

## 2023-08-11 RX ADMIN — ROCURONIUM BROMIDE 50 MG: 10 INJECTION, SOLUTION INTRAVENOUS at 09:08

## 2023-08-11 NOTE — TRANSFER OF CARE
Anesthesia Transfer of Care Note    Patient: Smita Nguyen    Procedure(s) Performed: Procedure(s) (LRB):  Cystoscopy, retrograde pyelogram, ureteroscopy with laser lithotripsy, placement of ureteral stent (Right)    Patient location: PACU    Transport from OR: Transported from OR on room air with adequate spontaneous ventilation    Post pain: adequate analgesia    Post assessment: no apparent anesthetic complications and tolerated procedure well    Post vital signs: stable    Level of consciousness: awake and alert    Nausea/Vomiting: no nausea/vomiting    Complications: none    Transfer of care protocol was followed      Last vitals:   Visit Vitals  BP (!) 105/57 (BP Location: Right arm, Patient Position: Lying)   Pulse 84   Temp 36.3 °C (97.3 °F) (Temporal)   Resp 14   Wt 70.8 kg (156 lb 1 oz)   LMP  (LMP Unknown)   SpO2 98%   Breastfeeding No   BMI 28.54 kg/m²

## 2023-08-11 NOTE — ANESTHESIA PROCEDURE NOTES
Intubation    Date/Time: 8/11/2023 9:19 AM    Performed by: Ana Buckley  Authorized by: Kane Castillo MD    Intubation:     Induction:  Intravenous    Intubated:  Postinduction    Mask Ventilation:  Easy mask    Attempts:  1    Attempted By:  Student    Method of Intubation:  Direct    Blade:  Beth 3    Laryngeal View Grade: Grade I - full view of cords      Difficult Airway Encountered?: No      Complications:  None    Airway Device:  Oral endotracheal tube    Airway Device Size:  7.0    Style/Cuff Inflation:  Cuffed (inflated to minimal occlusive pressure)    Inflation Amount (mL):  7    Tube secured:  22    Secured at:  The lips    Placement Verified By:  Capnometry    Complicating Factors:  None    Findings Post-Intubation:  BS equal bilateral and atraumatic/condition of teeth unchanged

## 2023-08-11 NOTE — OP NOTE
[facility]  Surgery Department  Urology Operative Note    SUMMARY     Date of Procedure: 8/11/2023     Surgeon(s) and Role:     * Ly Floyd MD - Primary    Assisting Surgeon: None    Pre-Operative Diagnosis: Right ureteral calculus [N20.1]    Post-Operative Diagnosis: Post-Op Diagnosis Codes:     * Right ureteral calculus [N20.1]    Procedure: Procedure(s) (LRB):  Cystoscopy, right retrograde pyelogram, ureteroscopy with laser lithotripsy, stone extraction, placement of ureteral stent (Right)    Anesthesia: Choice    Indication for Procedure: 23yo F with large 1.2cm R proximal ureteral calculus. She presents today for definitive treatment of stone.     Description of Procedure: The patient was brought to operating room and placed under general anesthesia. Full time out procedures were performed identifying correct patient, procedure and laterality. Appropriate antibiotics with Ancef were given prior to commencement of surgery. The patient was placed in dorsal lithotomy position and prepped and draped in the usual sterile fashion.    A 22Fr rigid cystoscopy was placed per urethral and passed into the bladder. Cystoscopy did not reveal any abnormality of the bladder.  film was obtained, which confirmed the location of the stone in the right proximal ureter.  The right ureteral orifice was identified and a Sensor wire was advanced to the level of the stone under fluoroscopic guidance.  The stone was noted to have resistance at the level of the stone and was unable to bypass the stone initially.  For this reason a 5 Thai open-end ureteral catheter was passed over the stent to the level of the stone.  Retrograde pyelogram was performed with full-strength Omnipaque solution.  A filling defect consistent with the stone was identified.  Contrast was noted to pass proximally to the stone and opacify the renal pelvis and collecting system.  Moderate hydronephrosis identified.  A zip wire was then  used through the 5 Nigerian open-end ureteral catheter and was able to bypass the area of the stone and advanced into the renal pelvis under fluoroscopic guidance.  The wire was exchanged for the sensor wire after the 5 Nigerian was advanced past the stone.  A 2nd wire was placed to serve as a safety wire and was secured to the drapes.  The stone was noted to retropulsed to the renal pelvis.      A 11/13Fr 36cm ureteral access sheath was then placed over the wire and into the proximal ureter under fluoroscopic guidance. Flexible ureteroscopy was then passed into the ureteral access sheath to the level of the stone. The stone was identified within the renal pelvis. Laser lithotripsy was then performed using the Holmium laser and the stone was fragmented into multiple pieces. Any large, potentially obstructing fragments were extracted using the Zero-tipped Nitinol basket. These fragments were collected and submitted as a specimen.    The entire renal pelvis was then inspected and all calices were noted to be void of any large stone fragments. The ureteral access sheath was removed under direct vision with no injuries or additional stones identified. Under fluoroscopic guidance, the guidewire was then exchanged for a  4.8 x24 double-J ureteral stent. Good coils were confirmed within the renal pelvis and the bladder using fluoroscopy and cytoscopy. The string was not removed from the stent prior to placement. The bladder was then emptied and the cystoscope removed. The patient was then awakened and transferred to PACU in stable condition.     She will remove the stent at home Tuesday 08/15/2023 and follow-up with me 3-4 weeks .    Findings:  Large radiopaque calculus in the right proximal ureter, retropulsed to the renal pelvis with placement of wire.  Stone was fragmented and removed.  Stent on string placed.    Complications: No    Estimated Blood Loss (EBL): <5cc    Drains:  4.8 Nigerian by 24 cm double-J ureteral stent in  the right ureter, on string           Implants:   Implant Name Type Inv. Item Serial No.  Lot No. LRB No. Used Action   STENT URET PERCUFLEX 4.8F 24CM - [id number]  STENT URET PERCUFLEX 4.8F 24CM  [employer] [id number] Right 1 Implanted       Specimens:   Specimen (24h ago, onward)       Start     Ordered    08/11/23 1050  Specimen to Pathology, Surgery Urology  Once        Comments: Pre-op Diagnosis: Right ureteral calculus [N20.1]Procedure(s):Cystoscopy, retrograde pyelogram, ureteroscopy with laser lithotripsy, placement of ureteral stent Number of specimens: 1Name of specimens: 1. Right ureter stone     References:    Click here for ordering Quick Tip   Question Answer Comment   Procedure Type: Urology    Which provider would you like to cc? CHELSEA MORILLO    Release to patient Immediate        08/11/23 1050    08/11/23 1031  Specimen to Pathology, Surgery Urology  Once        Comments: Pre-op Diagnosis: Right ureteral calculus [N20.1]Procedure(s):Cystoscopy, possible retrograde pyelogram, ureteroscopy with laser lithotripsy, placement of ureteral stent Number of specimens: 1Name of specimens: 1. Right ureteral stone     References:    Click here for ordering Quick Tip   Question Answer Comment   Procedure Type: Urology    Specimen Class: Routine/Screening    Which provider would you like to cc? CHELSEA MORILLO    Release to patient Immediate        08/11/23 1032                            Condition: Good    Disposition: PACU - hemodynamically stable.    Attestation: I was present and scrubbed for the entire procedure.    Discharge Note    SUMMARY     Admit Date: 8/11/2023    Discharge Date and Time:  08/11/2023 9:13 AM    Hospital Course (synopsis of major diagnoses, care, treatment, and services provided during the course of the hospital stay): Uncomplicated URS/LL/stent.      Final Diagnosis: Post-Op Diagnosis Codes:     * Right ureteral calculus [N20.1]    Disposition: Home or  Self Care    Follow Up/Patient Instructions:     Medications:  Reconciled Home Medications:      Medication List        START taking these medications      cephALEXin 500 MG capsule  Commonly known as: KEFLEX  Take 1 capsule (500 mg total) by mouth every 12 (twelve) hours.     phenazopyridine 100 MG tablet  Commonly known as: PYRIDIUM  Take 2 tablets (200 mg total) by mouth 3 (three) times daily with meals.            CHANGE how you take these medications      * HYDROcodone-acetaminophen  mg per tablet  Commonly known as: NORCO  Take 1 tablet by mouth every 6 (six) hours as needed for Pain.  What changed: Another medication with the same name was added. Make sure you understand how and when to take each.     * HYDROcodone-acetaminophen 5-325 mg per tablet  Commonly known as: NORCO  Take 1 tablet by mouth every 6 (six) hours as needed for Pain.  What changed: You were already taking a medication with the same name, and this prescription was added. Make sure you understand how and when to take each.           * This list has 2 medication(s) that are the same as other medications prescribed for you. Read the directions carefully, and ask your doctor or other care provider to review them with you.                Discharge Procedure Orders   Diet general     No dressing needed     Call MD for:  temperature >100.4     Call MD for:  persistent nausea and vomiting     Call MD for:  severe uncontrolled pain     Call MD for:  difficulty breathing, headache or visual disturbances     Activity as tolerated      Follow-up Information       Ly Floyd MD Follow up in 3 week(s).    Specialty: Urology  Why: For post-op follow up  Contact information:  [address]  [address] [place] [id number] [phone]

## 2023-08-11 NOTE — H&P
Subjective:       Smita Nguyen is a 22 y.o. female who is a new patient who was self-referred  for evaluation of R ureteral stone.      She was seen in ER recently with R pelvic pain/cramps. CT showed large 1.1cm R prox ureteral stone with moderate hydronephrosis. Prior h/o stones a few years ago 2020, no prior surgery - believes she passed these. Now with urinary frequency and sensation of CANDY. Notes dark urine. UA with RBCs. No UCx. No fevers/dysuria.     PVR (bladder scan) today - 43cc          The following portions of the patient's history were reviewed and updated as appropriate: allergies, current medications, past family history, past medical history, past social history, past surgical history and problem list.    Review of Systems  12 point review of systems completed. Pertinent positive and negatives listed in HPI        Objective:    Vitals: /67   Pulse 78   Temp 99.1 °F (37.3 °C)   Resp 18   Wt 70.8 kg (156 lb 1 oz)   LMP  (LMP Unknown) Comment: has IUD , no periods for 3 years  SpO2 98%   Breastfeeding No   BMI 28.54 kg/m²     Physical Exam   General: well developed, well nourished in no acute distress  Head: normocephalic, atraumatic  Neck: supple, trachea midline, no obvious enlargement of thyroid  HEENT: EOMI, mucus membranes moist, sclera anicteric, no hearing impairment  Lungs: symmetric expansion, non-labored breathing  Skin: no rashes or lesions  Neuro: alert and oriented x 3, no gross deficits  Psych: normal judgment and insight, normal mood/affect and non-anxious  Genitourinary: deferred      Lab Review   Urine analysis today in clinic shows positive for red blood cells 250, 30 protein     Lab Results   Component Value Date    WBC 7.13 08/08/2023    HGB 12.9 08/08/2023    HGB 9.4 (L) 08/03/2012    HCT 39.6 08/08/2023    MCV 90 08/08/2023     08/08/2023     Lab Results   Component Value Date    CREATININE 0.8 08/08/2023    CREATININE 0.6 08/03/2012    BUN 10 08/08/2023          Imaging  Images and reports were personally reviewed by me and discussed with patient  CT reviewed       Assessment/Plan:      1. Right ureteral calculus    - Large 1.1cm R prox ureteral stone. Unlikely to pass spontaneously.    - Recommend R URS/LL/stent.  May need >1 procedure. Discussed r/b/a.    - UCx today   - Discussed stone prevention   - Discussed red flags to go to ER: fever, inability to tolerate PO, pain not controlled by medications.   - OR 8/11/23     2. Sensation of pressure in bladder area    - Suspect 2/2 ureteral stone   - PVR low   - UCx today         Follow up in 3 weeks

## 2023-08-11 NOTE — PATIENT INSTRUCTIONS
Expect blood and/or burning with urination. Drink plenty of fluid to keep bladder flushed.    **Remove stent by removing tape and pulling string on the morning of Tuesday 8/15/2023. Do not cut string. Expect to see a long, thin tube with a curl on each end attached to the string. Call with issues or if stent does not come out.**

## 2023-08-11 NOTE — ANESTHESIA PREPROCEDURE EVALUATION
08/11/2023  Smita Nguyen is a 22 y.o., female.      Pre-op Assessment    I have reviewed the Patient Summary Reports.     I have reviewed the Nursing Notes. I have reviewed the NPO Status.   I have reviewed the Medications.     Review of Systems  Anesthesia Hx:  No problems with previous Anesthesia    Social:  Vaping    Hematology/Oncology:  Hematology Normal        EENT/Dental:EENT/Dental Normal   Cardiovascular:  Cardiovascular Normal     Neurological:  Neurology Normal    Endocrine:  Endocrine Normal    Psych:  Psychiatric Normal           Physical Exam  General: Well nourished, Cooperative, Alert and Oriented    Airway:  Mallampati: III / II  Mouth Opening: Normal  TM Distance: Normal  Tongue: Normal  Neck ROM: Normal ROM    Dental:  Intact        Anesthesia Plan  Type of Anesthesia, risks & benefits discussed:    Anesthesia Type: Gen ETT  Intra-op Monitoring Plan: Standard ASA Monitors  Induction:  IV  Airway Plan: Video, Post-Induction  Informed Consent: Informed consent signed with the Patient and all parties understand the risks and agree with anesthesia plan.  All questions answered. Patient consented to blood products? No  ASA Score: 1    Ready For Surgery From Anesthesia Perspective.     .

## 2023-08-11 NOTE — PLAN OF CARE
Patient has situational anxiety , post anxiety attack in phase I ,and would like her mother to be with her in the holding area.

## 2023-08-11 NOTE — ANESTHESIA POSTPROCEDURE EVALUATION
Anesthesia Post Evaluation    Patient: Smita Nguyen    Procedure(s) Performed: Procedure(s) (LRB):  Cystoscopy, retrograde pyelogram, ureteroscopy with laser lithotripsy, placement of ureteral stent (Right)    Final Anesthesia Type: general      Patient location during evaluation: PACU  Patient participation: Yes- Able to Participate  Level of consciousness: awake and alert and oriented  Post-procedure vital signs: reviewed and stable  Pain management: adequate  Airway patency: patent    PONV status at discharge: No PONV  Anesthetic complications: no      Cardiovascular status: hemodynamically stable and blood pressure returned to baseline  Respiratory status: spontaneous ventilation, room air and unassisted  Hydration status: euvolemic  Follow-up not needed.          Vitals Value Taken Time   BP 90/51 08/11/23 1215   Temp 36.6 °C (97.8 °F) 08/11/23 1215   Pulse 64 08/11/23 1215   Resp 15 08/11/23 1215   SpO2 98 % 08/11/23 1215         Event Time   Out of Recovery 12:09:48         Pain/Too Score: Pain Rating Prior to Med Admin: 7 (8/11/2023 11:34 AM)  Pain Rating Post Med Admin: 7 (8/11/2023 11:34 AM)  Too Score: 10 (8/11/2023 12:00 PM)

## 2023-08-14 LAB
FINAL PATHOLOGIC DIAGNOSIS: NORMAL
GROSS: NORMAL
Lab: NORMAL

## 2023-08-18 LAB
COMPN STONE: NORMAL
LABORATORY COMMENT REPORT: NORMAL
SPECIMEN SOURCE: NORMAL
STONE ANALYSIS IR-IMP: NORMAL

## 2024-09-27 ENCOUNTER — PATIENT MESSAGE (OUTPATIENT)
Dept: OBSTETRICS AND GYNECOLOGY | Facility: CLINIC | Age: 24
End: 2024-09-27

## 2024-09-27 ENCOUNTER — CLINICAL SUPPORT (OUTPATIENT)
Dept: OBSTETRICS AND GYNECOLOGY | Facility: CLINIC | Age: 24
End: 2024-09-27
Payer: MEDICAID

## 2024-09-27 DIAGNOSIS — O36.80X0 PREGNANCY WITH UNCERTAIN FETAL VIABILITY, SINGLE OR UNSPECIFIED FETUS: ICD-10-CM

## 2024-09-27 DIAGNOSIS — N91.2 AMENORRHEA: Primary | ICD-10-CM

## 2024-09-27 PROCEDURE — 99999 PR PBB SHADOW E&M-EST. PATIENT-LVL II: CPT | Mod: PBBFAC,,,

## 2024-09-27 PROCEDURE — 99212 OFFICE O/P EST SF 10 MIN: CPT | Mod: PBBFAC,TH

## 2024-09-27 NOTE — PROGRESS NOTES
New pt with positive at home UPT. Based on LMP 9/1/24 JANETH is 6/8/25. Approximately 3w5d gestation. No known pregnancy complications.     During this visit, pt chart was reviewed and updated with patient including but not limited to demographics, Allergies, medications, pharmacy, medical/family history and OB history updated.      Patient was guided through expectations of care during pregnancy and office visits.     Pregnancy confirmation, initial OB and first routine OB appts scheduled. Message was sent to [facility] staff to schedule and coordinating dating u/s.         Discussed with pt:     Importance of PNV. Recommendation provided. Pt reports not taking PNV yet.   Pt. denies n/v  Pt. denies cramping/spotting.  Precautions of pregnancy discussed.  Referred to [email or web address] for Preg A to Z guide & class schedule   Discussed benefits of breastfeeding - Pt. plans to breastfeed   Discussed need for pediatrician. Pt already has a ped.       Education provided & questions answered. Pt verbalized understanding of prenatal education reviewed during this visit. All questions were answered and pt had no further questions. 30 minutes-Total time spent with pt during this visit.

## 2024-10-11 ENCOUNTER — PATIENT MESSAGE (OUTPATIENT)
Dept: URGENT CARE | Facility: CLINIC | Age: 24
End: 2024-10-11
Payer: MEDICAID

## 2024-10-18 ENCOUNTER — LAB VISIT (OUTPATIENT)
Dept: LAB | Facility: HOSPITAL | Age: 24
End: 2024-10-18
Attending: OBSTETRICS & GYNECOLOGY
Payer: MEDICAID

## 2024-10-18 ENCOUNTER — OFFICE VISIT (OUTPATIENT)
Dept: OBSTETRICS AND GYNECOLOGY | Facility: CLINIC | Age: 24
End: 2024-10-18
Payer: MEDICAID

## 2024-10-18 VITALS — BODY MASS INDEX: 28.23 KG/M2 | DIASTOLIC BLOOD PRESSURE: 64 MMHG | SYSTOLIC BLOOD PRESSURE: 98 MMHG | WEIGHT: 154.31 LBS

## 2024-10-18 DIAGNOSIS — Z32.01 POSITIVE PREGNANCY TEST: Primary | ICD-10-CM

## 2024-10-18 DIAGNOSIS — Z32.01 POSITIVE PREGNANCY TEST: ICD-10-CM

## 2024-10-18 LAB
ABO + RH BLD: NORMAL
ALBUMIN SERPL BCP-MCNC: 3.7 G/DL (ref 3.5–5.2)
ALP SERPL-CCNC: 75 U/L (ref 40–150)
ALT SERPL W/O P-5'-P-CCNC: 18 U/L (ref 10–44)
ANION GAP SERPL CALC-SCNC: 9 MMOL/L (ref 8–16)
AST SERPL-CCNC: 17 U/L (ref 10–40)
B-HCG UR QL: POSITIVE
BASOPHILS # BLD AUTO: 0.02 K/UL (ref 0–0.2)
BASOPHILS NFR BLD: 0.2 % (ref 0–1.9)
BILIRUB SERPL-MCNC: 0.3 MG/DL (ref 0.1–1)
BLD GP AB SCN CELLS X3 SERPL QL: NORMAL
BUN SERPL-MCNC: 10 MG/DL (ref 6–20)
CALCIUM SERPL-MCNC: 9.8 MG/DL (ref 8.7–10.5)
CHLORIDE SERPL-SCNC: 103 MMOL/L (ref 95–110)
CO2 SERPL-SCNC: 25 MMOL/L (ref 23–29)
CREAT SERPL-MCNC: 0.8 MG/DL (ref 0.5–1.4)
CTP QC/QA: YES
DIFFERENTIAL METHOD BLD: NORMAL
EOSINOPHIL # BLD AUTO: 0.1 K/UL (ref 0–0.5)
EOSINOPHIL NFR BLD: 0.7 % (ref 0–8)
ERYTHROCYTE [DISTWIDTH] IN BLOOD BY AUTOMATED COUNT: 12.1 % (ref 11.5–14.5)
EST. GFR  (NO RACE VARIABLE): >60 ML/MIN/1.73 M^2
GLUCOSE SERPL-MCNC: 108 MG/DL (ref 70–110)
HCT VFR BLD AUTO: 39.3 % (ref 37–48.5)
HGB BLD-MCNC: 12.9 G/DL (ref 12–16)
IMM GRANULOCYTES # BLD AUTO: 0.03 K/UL (ref 0–0.04)
IMM GRANULOCYTES NFR BLD AUTO: 0.3 % (ref 0–0.5)
LYMPHOCYTES # BLD AUTO: 2.4 K/UL (ref 1–4.8)
LYMPHOCYTES NFR BLD: 26.9 % (ref 18–48)
MCH RBC QN AUTO: 30.1 PG (ref 27–31)
MCHC RBC AUTO-ENTMCNC: 32.8 G/DL (ref 32–36)
MCV RBC AUTO: 92 FL (ref 82–98)
MONOCYTES # BLD AUTO: 0.5 K/UL (ref 0.3–1)
MONOCYTES NFR BLD: 5.1 % (ref 4–15)
NEUTROPHILS # BLD AUTO: 5.9 K/UL (ref 1.8–7.7)
NEUTROPHILS NFR BLD: 66.8 % (ref 38–73)
NRBC BLD-RTO: 0 /100 WBC
PLATELET # BLD AUTO: 348 K/UL (ref 150–450)
PMV BLD AUTO: 9.3 FL (ref 9.2–12.9)
POTASSIUM SERPL-SCNC: 3.8 MMOL/L (ref 3.5–5.1)
PROT SERPL-MCNC: 7.6 G/DL (ref 6–8.4)
RBC # BLD AUTO: 4.29 M/UL (ref 4–5.4)
SODIUM SERPL-SCNC: 137 MMOL/L (ref 136–145)
SPECIMEN OUTDATE: NORMAL
WBC # BLD AUTO: 8.89 K/UL (ref 3.9–12.7)

## 2024-10-18 PROCEDURE — 86901 BLOOD TYPING SEROLOGIC RH(D): CPT | Performed by: OBSTETRICS & GYNECOLOGY

## 2024-10-18 PROCEDURE — 86803 HEPATITIS C AB TEST: CPT | Performed by: OBSTETRICS & GYNECOLOGY

## 2024-10-18 PROCEDURE — 86593 SYPHILIS TEST NON-TREP QUANT: CPT | Performed by: OBSTETRICS & GYNECOLOGY

## 2024-10-18 PROCEDURE — 87086 URINE CULTURE/COLONY COUNT: CPT | Performed by: OBSTETRICS & GYNECOLOGY

## 2024-10-18 PROCEDURE — 99212 OFFICE O/P EST SF 10 MIN: CPT | Mod: PBBFAC,TH,25 | Performed by: OBSTETRICS & GYNECOLOGY

## 2024-10-18 PROCEDURE — 83036 HEMOGLOBIN GLYCOSYLATED A1C: CPT | Performed by: OBSTETRICS & GYNECOLOGY

## 2024-10-18 PROCEDURE — 87389 HIV-1 AG W/HIV-1&-2 AB AG IA: CPT | Performed by: OBSTETRICS & GYNECOLOGY

## 2024-10-18 PROCEDURE — 80053 COMPREHEN METABOLIC PANEL: CPT | Performed by: OBSTETRICS & GYNECOLOGY

## 2024-10-18 PROCEDURE — 87340 HEPATITIS B SURFACE AG IA: CPT | Performed by: OBSTETRICS & GYNECOLOGY

## 2024-10-18 PROCEDURE — 85025 COMPLETE CBC W/AUTO DIFF WBC: CPT | Performed by: OBSTETRICS & GYNECOLOGY

## 2024-10-18 PROCEDURE — 86762 RUBELLA ANTIBODY: CPT | Performed by: OBSTETRICS & GYNECOLOGY

## 2024-10-18 PROCEDURE — 99999 PR PBB SHADOW E&M-EST. PATIENT-LVL II: CPT | Mod: PBBFAC,,, | Performed by: OBSTETRICS & GYNECOLOGY

## 2024-10-18 NOTE — PROGRESS NOTES
"Subjective     Patient ID: Smita Nguyen is a 23 y.o. female.    Chief Complaint:  Possible Pregnancy      History of Present Illness  HPI  Missed Menses/ Possible Pregnancy    Smita Nguyen  complains of amenorrhea. She believes she could be pregnant.   Patient's last menstrual period was 2024 (exact date).. UPT is Positive.  Pregnancy is desired. Sexual Activity: single partner, contraception: none. Current symptoms also include: fatigue and positive home pregnancy test. Last period was normal.  She is taking a PNV.  She denies nausea/vomiting.                   GYN & OB History  Patient's last menstrual period was 2024 (exact date).   Date of Last Pap: No result found    OB History    Para Term  AB Living   2         1   SAB IAB Ectopic Multiple Live Births           1      # Outcome Date GA Lbr Salvador/2nd Weight Sex Type Anes PTL Lv   2 Current            1  2020     Vag-Spont   FATIMAH     Past Medical History:  Past Medical History:   Diagnosis Date    Anxiety     Nicotine dependence     vapes    Thyroid condition     mom not sure but has "thyroid problem", was on med but hasn't refilled , not sure of name of med       Past Surgical History:  Past Surgical History:   Procedure Laterality Date    APPENDECTOMY      REPAIR OF LACERATION      URETEROSCOPIC REMOVAL OF URETERIC CALCULUS Right 2023    Procedure: Cystoscopy, retrograde pyelogram, ureteroscopy with laser lithotripsy, placement of ureteral stent;  Surgeon: Ly Floyd MD;  Location: [facility];  Service: Urology;  Laterality: Right;  RN PREOP 2023----LATEX ALLERGY----UPT       Family History:  Family History   Problem Relation Name Age of Onset    Diabetes Paternal Grandmother      Heart disease Mother      Asthma Brother         Allergies:  Review of patient's allergies indicates:   Allergen Reactions    Latex Itching       Medications:  Current Outpatient Medications on File Prior to Visit   Medication " Sig Dispense Refill    cephALEXin (KEFLEX) 500 MG capsule Take 1 capsule (500 mg total) by mouth every 12 (twelve) hours. 4 capsule 0    HYDROcodone-acetaminophen (NORCO)  mg per tablet Take 1 tablet by mouth every 6 (six) hours as needed for Pain. (Patient not taking: Reported on 9/27/2024) 20 tablet 0    HYDROcodone-acetaminophen (NORCO) 5-325 mg per tablet Take 1 tablet by mouth every 6 (six) hours as needed for Pain. (Patient not taking: Reported on 9/27/2024) 8 tablet 0    phenazopyridine (PYRIDIUM) 100 MG tablet Take 2 tablets (200 mg total) by mouth 3 (three) times daily with meals. 18 tablet 0     No current facility-administered medications on file prior to visit.       Social History:  Social History     Tobacco Use    Smoking status: Former     Types: Vaping with nicotine    Smokeless tobacco: Never   Substance Use Topics    Alcohol use: No    Drug use: No            Review of Systems  Review of Systems   Constitutional: Negative.    HENT: Negative.     Eyes: Negative.    Respiratory: Negative.     Cardiovascular: Negative.    Gastrointestinal: Negative.    Endocrine: Negative.    Genitourinary: Negative.    Musculoskeletal: Negative.    Integumentary:  Negative.   Neurological: Negative.    Hematological: Negative.    Psychiatric/Behavioral: Negative.     Breast: negative.           Objective   Physical Exam:   Constitutional: She is oriented to person, place, and time. She appears well-developed.    HENT:   Head: Normocephalic and atraumatic.    Eyes: EOM are normal.     Cardiovascular:  Normal rate.             Pulmonary/Chest: Effort normal.        Abdominal: Soft. There is no abdominal tenderness.             Musculoskeletal: Normal range of motion.       Neurological: She is oriented to person, place, and time.    Skin: Skin is warm.    Psychiatric: She has a normal mood and affect.            Assessment and Plan     1. Positive pregnancy test             Plan:  1. Positive pregnancy test  (Primary)  - Counseled to avoid cat litter, not garden without gloves, avoid raw meat, heat up deli meat, to eat large fish like tuna no more than once a week, and to avoid soft unpasteurized cheeses.  I recommend a PNV daily.  She should avoid ibuprofen.    - Patient was counseled today on routine 1st trimester precautions, including vaginal bleeding and abdominal pain. We also discussed proper weight gain based on the [facility]'s recommendations based on her pre-pregnancy weight, foods to avoid in pregnancy (i.e. sushi, fish that are high in mercury, cold deli meat, and unpasteurized cheeses), environmental precautions such as cat litter, and prenatal vitamin options (i.e. stool softener, DHA).    - Counseled on Materni T 21.       - Urine culture  - C. trachomatis/N. gonorrhoeae by AMP DNA  - POCT urine pregnancy  - Comprehensive Metabolic Panel; Future  - Hemoglobin A1C; Future  - Hepatitis C Antibody; Future  - HIV 1/2 Ag/Ab (4th Gen); Future  - Treponema Pallidium Antibodies IgG, IgM; Future  - Hepatitis B surface antigen; Future  - Type & Screen; Future  - Rubella antibody, IgG; Future  - CBC auto differential; Future

## 2024-10-19 LAB
BACTERIA UR CULT: NORMAL
ESTIMATED AVG GLUCOSE: 97 MG/DL (ref 68–131)
HBA1C MFR BLD: 5 % (ref 4–5.6)
HBV SURFACE AG SERPL QL IA: NORMAL
HCV AB SERPL QL IA: NORMAL
HIV 1+2 AB+HIV1 P24 AG SERPL QL IA: NORMAL
TREPONEMA PALLIDUM IGG+IGM AB [PRESENCE] IN SERUM OR PLASMA BY IMMUNOASSAY: NONREACTIVE

## 2024-10-21 LAB
RUBV IGG SER-ACNC: 92.7 IU/ML
RUBV IGG SER-IMP: REACTIVE

## 2024-10-28 ENCOUNTER — PROCEDURE VISIT (OUTPATIENT)
Dept: MATERNAL FETAL MEDICINE | Facility: CLINIC | Age: 24
End: 2024-10-28
Payer: MEDICAID

## 2024-10-28 DIAGNOSIS — O36.80X0 PREGNANCY WITH UNCERTAIN FETAL VIABILITY, SINGLE OR UNSPECIFIED FETUS: ICD-10-CM

## 2024-10-28 DIAGNOSIS — Z36.89 ENCOUNTER FOR FETAL ANATOMIC SURVEY: Primary | ICD-10-CM

## 2024-11-08 ENCOUNTER — INITIAL PRENATAL (OUTPATIENT)
Dept: OBSTETRICS AND GYNECOLOGY | Facility: CLINIC | Age: 24
End: 2024-11-08
Payer: MEDICAID

## 2024-11-08 VITALS — DIASTOLIC BLOOD PRESSURE: 70 MMHG | WEIGHT: 173.5 LBS | SYSTOLIC BLOOD PRESSURE: 120 MMHG | BODY MASS INDEX: 31.73 KG/M2

## 2024-11-08 DIAGNOSIS — Z3A.09 9 WEEKS GESTATION OF PREGNANCY: ICD-10-CM

## 2024-11-08 DIAGNOSIS — Z34.81 ENCOUNTER FOR SUPERVISION OF OTHER NORMAL PREGNANCY IN FIRST TRIMESTER: Primary | ICD-10-CM

## 2024-11-08 PROCEDURE — 99212 OFFICE O/P EST SF 10 MIN: CPT | Mod: PBBFAC,TH | Performed by: OBSTETRICS & GYNECOLOGY

## 2024-11-08 PROCEDURE — 99999 PR PBB SHADOW E&M-EST. PATIENT-LVL II: CPT | Mod: PBBFAC,,, | Performed by: OBSTETRICS & GYNECOLOGY

## 2024-11-08 NOTE — PROGRESS NOTES
9w3d. Pt reports No major complaints.  Denies contractions, vaginal bleeding, or leakage of fluid.    Trisomy screening:CFDNA ordered  F/U 4 weeks. Next visit: routine care    Total time spent on visit was 20 minutes.  This includes face to face time and non-face to face time preparing to see the patient (eg, review of tests), Obtaining and/or reviewing separately obtained history, Documenting clinical information in the electronic or other health record, Independently interpreting resultsand communicating results to the patient/family/caregiver, or Care coordination.

## 2024-11-12 ENCOUNTER — LAB VISIT (OUTPATIENT)
Dept: LAB | Facility: HOSPITAL | Age: 24
End: 2024-11-12
Attending: OBSTETRICS & GYNECOLOGY
Payer: MEDICAID

## 2024-11-12 DIAGNOSIS — Z34.81 ENCOUNTER FOR SUPERVISION OF OTHER NORMAL PREGNANCY IN FIRST TRIMESTER: ICD-10-CM

## 2024-11-12 PROCEDURE — 36415 COLL VENOUS BLD VENIPUNCTURE: CPT | Performed by: OBSTETRICS & GYNECOLOGY

## 2024-11-17 LAB
ABOUT THE TEST: NORMAL
APPROVED BY: NORMAL
FETAL FRACTION: NORMAL
FETAL SEX RESULT: NORMAL
GESTATIONAL AGE > 9: YES
GESTATIONAL AGE: NORMAL
LAB DIRECTOR COMMENTS: NORMAL
LIMITATIONS:: NORMAL
MONOSOMY X RESULT: NOT DETECTED
NEGATIVE PREDICTIVE VALUE: NORMAL
NOTE: NORMAL
PERFORMANCE CHARACTERISTICS: NORMAL
PERFORMANCE: NORMAL
POSITIVE PREDICTIVE VALUE: NORMAL
RESULT: NEGATIVE
SERVICE CMNT 04-IMP: NORMAL
TEST METHODOLOGY:: NORMAL
TRISOMY 13 (T13): NEGATIVE
TRISOMY 18: NEGATIVE
TRISOMY 21 (T21): NEGATIVE
XXX (TRIPLE X SYNDROME): NOT DETECTED
XXY (KLINEFELTER SYNDROME): NOT DETECTED
XYY (JACOBS SYNDROME): NOT DETECTED

## 2024-11-18 ENCOUNTER — PATIENT MESSAGE (OUTPATIENT)
Dept: OBSTETRICS AND GYNECOLOGY | Facility: CLINIC | Age: 24
End: 2024-11-18
Payer: MEDICAID

## 2024-12-02 ENCOUNTER — PATIENT MESSAGE (OUTPATIENT)
Dept: MATERNAL FETAL MEDICINE | Facility: CLINIC | Age: 24
End: 2024-12-02
Payer: MEDICAID

## 2024-12-06 ENCOUNTER — ROUTINE PRENATAL (OUTPATIENT)
Dept: OBSTETRICS AND GYNECOLOGY | Facility: CLINIC | Age: 24
End: 2024-12-06
Payer: MEDICAID

## 2024-12-06 VITALS
BODY MASS INDEX: 31.21 KG/M2 | SYSTOLIC BLOOD PRESSURE: 100 MMHG | DIASTOLIC BLOOD PRESSURE: 60 MMHG | WEIGHT: 170.63 LBS

## 2024-12-06 DIAGNOSIS — Z34.92 NORMAL PREGNANCY, SECOND TRIMESTER: Primary | ICD-10-CM

## 2024-12-06 DIAGNOSIS — Z3A.13 13 WEEKS GESTATION OF PREGNANCY: ICD-10-CM

## 2024-12-06 DIAGNOSIS — O21.9 NAUSEA AND VOMITING DURING PREGNANCY: ICD-10-CM

## 2024-12-06 PROCEDURE — 99212 OFFICE O/P EST SF 10 MIN: CPT | Mod: PBBFAC,TH | Performed by: OBSTETRICS & GYNECOLOGY

## 2024-12-06 PROCEDURE — 99999 PR PBB SHADOW E&M-EST. PATIENT-LVL II: CPT | Mod: PBBFAC,,, | Performed by: OBSTETRICS & GYNECOLOGY

## 2024-12-06 RX ORDER — PRENATAL WITH FERROUS FUM AND FOLIC ACID 3080; 920; 120; 400; 22; 1.84; 3; 20; 10; 1; 12; 200; 27; 25; 2 [IU]/1; [IU]/1; MG/1; [IU]/1; MG/1; MG/1; MG/1; MG/1; MG/1; MG/1; UG/1; MG/1; MG/1; MG/1; MG/1
1 TABLET ORAL DAILY
Qty: 90 TABLET | Refills: 4 | Status: SHIPPED | OUTPATIENT
Start: 2024-12-06 | End: 2025-12-06

## 2024-12-06 RX ORDER — PROMETHAZINE HYDROCHLORIDE 25 MG/1
25 TABLET ORAL EVERY 4 HOURS
Qty: 30 TABLET | Refills: 4 | Status: SHIPPED | OUTPATIENT
Start: 2024-12-06

## 2024-12-06 NOTE — PROGRESS NOTES
13w3d . Pt reports No major complaints.  Still having some nausea  Denies contractions, vaginal bleeding, or leakage of fluid.    Trisomy screening:CFDNA normal  F/U 4 weeks. Next visit: routine care    1. Normal pregnancy, second trimester (Primary)  - Connected MOM Enrollment  - Assign Connected MOM Program Consent Questionnaire  - PNV,calcium 72/iron/folic acid (PRENATAL VITAMIN) Tab; Take 1 tablet by mouth once daily.  Dispense: 90 tablet; Refill: 4    2. 13 weeks gestation of pregnancy    3. Nausea and vomiting during pregnancy  - promethazine (PHENERGAN) 25 MG tablet; Take 1 tablet (25 mg total) by mouth every 4 (four) hours.  Dispense: 30 tablet; Refill: 4     Total time spent on visit was 20 minutes.  This includes face to face time and non-face to face time preparing to see the patient (eg, review of tests), Obtaining and/or reviewing separately obtained history, Documenting clinical information in the electronic or other health record, Independently interpreting resultsand communicating results to the patient/family/caregiver, or Care coordination.

## 2024-12-24 ENCOUNTER — PATIENT MESSAGE (OUTPATIENT)
Dept: OTHER | Facility: OTHER | Age: 24
End: 2024-12-24
Payer: MEDICAID

## 2025-01-03 ENCOUNTER — ROUTINE PRENATAL (OUTPATIENT)
Dept: OBSTETRICS AND GYNECOLOGY | Facility: CLINIC | Age: 25
End: 2025-01-03
Payer: MEDICAID

## 2025-01-03 VITALS
BODY MASS INDEX: 30.77 KG/M2 | DIASTOLIC BLOOD PRESSURE: 70 MMHG | WEIGHT: 168.19 LBS | SYSTOLIC BLOOD PRESSURE: 110 MMHG

## 2025-01-03 DIAGNOSIS — K21.9 GASTROESOPHAGEAL REFLUX DURING PREGNANCY IN SECOND TRIMESTER, ANTEPARTUM: ICD-10-CM

## 2025-01-03 DIAGNOSIS — Z34.92 NORMAL PREGNANCY, SECOND TRIMESTER: Primary | ICD-10-CM

## 2025-01-03 DIAGNOSIS — O99.612 GASTROESOPHAGEAL REFLUX DURING PREGNANCY IN SECOND TRIMESTER, ANTEPARTUM: ICD-10-CM

## 2025-01-03 DIAGNOSIS — Z3A.17 17 WEEKS GESTATION OF PREGNANCY: ICD-10-CM

## 2025-01-03 PROCEDURE — 99212 OFFICE O/P EST SF 10 MIN: CPT | Mod: PBBFAC,TH | Performed by: OBSTETRICS & GYNECOLOGY

## 2025-01-03 PROCEDURE — 99999 PR PBB SHADOW E&M-EST. PATIENT-LVL II: CPT | Mod: PBBFAC,,, | Performed by: OBSTETRICS & GYNECOLOGY

## 2025-01-03 RX ORDER — FAMOTIDINE 20 MG/1
20 TABLET, FILM COATED ORAL 2 TIMES DAILY
Qty: 60 TABLET | Refills: 4 | Status: SHIPPED | OUTPATIENT
Start: 2025-01-03 | End: 2026-01-03

## 2025-01-03 NOTE — PROGRESS NOTES
17w3d  . Pt reports No major complaints.   Denies contractions, vaginal bleeding, or leakage of fluid.    Trisomy screening:CFDNA normal  F/U 4 weeks. Next visit: routine care      Total time spent on visit was 20 minutes.  This includes face to face time and non-face to face time preparing to see the patient (eg, review of tests), Obtaining and/or reviewing separately obtained history, Documenting clinical information in the electronic or other health record, Independently interpreting resultsand communicating results to the patient/family/caregiver, or Care coordination.

## 2025-01-06 ENCOUNTER — HOSPITAL ENCOUNTER (EMERGENCY)
Facility: HOSPITAL | Age: 25
Discharge: HOME | End: 2025-01-06
Attending: EMERGENCY MEDICINE
Payer: MEDICAID

## 2025-01-06 VITALS
RESPIRATION RATE: 19 BRPM | HEART RATE: 77 BPM | WEIGHT: 170 LBS | OXYGEN SATURATION: 98 % | DIASTOLIC BLOOD PRESSURE: 60 MMHG | SYSTOLIC BLOOD PRESSURE: 132 MMHG | BODY MASS INDEX: 31.28 KG/M2 | HEIGHT: 62 IN | TEMPERATURE: 99 F

## 2025-01-06 DIAGNOSIS — R07.81 RIB PAIN: Primary | ICD-10-CM

## 2025-01-06 LAB
BACTERIA #/AREA URNS HPF: NORMAL /HPF
BILIRUB UR QL STRIP: NEGATIVE
CLARITY UR: ABNORMAL
COLOR UR: YELLOW
GLUCOSE UR QL STRIP: NEGATIVE
HGB UR QL STRIP: NEGATIVE
KETONES UR QL STRIP: NEGATIVE
LEUKOCYTE ESTERASE UR QL STRIP: ABNORMAL
MICROSCOPIC COMMENT: NORMAL
NITRITE UR QL STRIP: NEGATIVE
PH UR STRIP: 7 [PH] (ref 5–8)
PROT UR QL STRIP: NEGATIVE
SP GR UR STRIP: 1.01 (ref 1–1.03)
SQUAMOUS #/AREA URNS HPF: 2 /HPF
URN SPEC COLLECT METH UR: ABNORMAL
UROBILINOGEN UR STRIP-ACNC: NEGATIVE EU/DL
WBC #/AREA URNS HPF: 1 /HPF (ref 0–5)

## 2025-01-06 PROCEDURE — 93005 ELECTROCARDIOGRAM TRACING: CPT

## 2025-01-06 PROCEDURE — 81000 URINALYSIS NONAUTO W/SCOPE: CPT

## 2025-01-06 PROCEDURE — 93010 ELECTROCARDIOGRAM REPORT: CPT | Mod: ,,, | Performed by: INTERNAL MEDICINE

## 2025-01-06 PROCEDURE — 99283 EMERGENCY DEPT VISIT LOW MDM: CPT | Mod: 25

## 2025-01-06 NOTE — ED PROVIDER NOTES
"Encounter Date: 2025       History     Chief Complaint   Patient presents with    Abdominal Pain     Pt to ED from home with c/o left rib pain upon inhalation and movement x 3 days. Pt denies fall, injury, heavy lifting or urinary concerns. Pt currently 17 weeks pregnant.     24-year-old female approximately 18 weeks gravid A0 presents to ED for emergent evaluation of left-sided rib pain for the past few days.  She denies any associated trauma, fall, head trauma, LOC. she notices the pain with certain movements and deep inhalation.  She denies any recent long distance travel, surgeries, hormone therapy.  She denies any fever, chills, chest pain, shortness of breath, abdominal pain, hemoptysis, nausea, vomiting, diarrhea, leg swelling, dysuria, hematuria, vaginal bleeding, vaginal discharge.  No other symptoms reported.    The history is provided by the patient. No  was used.     Review of patient's allergies indicates:   Allergen Reactions    Latex Itching     Past Medical History:   Diagnosis Date    Anxiety     Nicotine dependence     vapes    Thyroid condition     mom not sure but has "thyroid problem", was on med but hasn't refilled , not sure of name of med     Past Surgical History:   Procedure Laterality Date    APPENDECTOMY      REPAIR OF LACERATION      URETEROSCOPIC REMOVAL OF URETERIC CALCULUS Right 2023    Procedure: Cystoscopy, retrograde pyelogram, ureteroscopy with laser lithotripsy, placement of ureteral stent;  Surgeon: Ly Floyd MD;  Location: [facility];  Service: Urology;  Laterality: Right;  RN PREOP 2023----LATEX ALLERGY----UPT     Family History   Problem Relation Name Age of Onset    Diabetes Paternal Grandmother      Heart disease Mother      Asthma Brother       Social History     Tobacco Use    Smoking status: Former     Types: Vaping with nicotine    Smokeless tobacco: Never   Substance Use Topics    Alcohol use: No    Drug use: No     Review " of Systems   Constitutional:  Negative for chills and fever.   HENT:  Negative for congestion, ear pain, rhinorrhea and sore throat.    Eyes:  Negative for redness.   Respiratory:  Negative for cough and shortness of breath.         (-) hemoptysis   Cardiovascular:  Negative for chest pain, palpitations and leg swelling.   Gastrointestinal:  Negative for abdominal pain, diarrhea, nausea and vomiting.   Genitourinary:  Negative for decreased urine volume, difficulty urinating, dysuria, frequency, hematuria and urgency.   Musculoskeletal:  Positive for myalgias. Negative for back pain and neck pain.   Skin:  Negative for rash.   Neurological:  Negative for headaches.   Psychiatric/Behavioral:  Negative for confusion.        Physical Exam     Initial Vitals [01/06/25 1220]   BP Pulse Resp Temp SpO2   132/60 77 19 98.5 °F (36.9 °C) 98 %      MAP       --         Physical Exam    Nursing note and vitals reviewed.  Constitutional: She appears well-developed and well-nourished.  Non-toxic appearance. She does not appear ill.   HENT:   Head: Normocephalic and atraumatic.   Right Ear: Hearing, tympanic membrane, external ear and ear canal normal. Tympanic membrane is not perforated, not erythematous and not bulging.   Left Ear: Hearing, tympanic membrane, external ear and ear canal normal. Tympanic membrane is not perforated, not erythematous and not bulging.   Nose: Nose normal. Mouth/Throat: Uvula is midline, oropharynx is clear and moist and mucous membranes are normal.   Eyes: Conjunctivae and EOM are normal.   Neck: Neck supple.   Normal range of motion.   Full passive range of motion without pain.     Cardiovascular:  Normal rate and regular rhythm.           Pulses:       Radial pulses are 2+ on the right side and 2+ on the left side.   Pulmonary/Chest: Effort normal and breath sounds normal. No accessory muscle usage. No respiratory distress. She has no decreased breath sounds.   Abdominal: Abdomen is soft. Bowel  sounds are normal. She exhibits no distension. There is no abdominal tenderness. There is no rebound and no guarding.   Musculoskeletal:         General: Normal range of motion.      Cervical back: Full passive range of motion without pain, normal range of motion and neck supple. No rigidity.     Neurological: She is alert. No cranial nerve deficit.   Neuro intact.  Strength and sensation intact bilateral upper and lower extremities.   Skin: Skin is warm and dry.   Psychiatric: She has a normal mood and affect.         ED Course   Procedures  Labs Reviewed   URINALYSIS, REFLEX TO URINE CULTURE - Abnormal       Result Value    Specimen UA Urine, Clean Catch      Color, UA Yellow      Appearance, UA Hazy (*)     pH, UA 7.0      Specific Gravity, UA 1.015      Protein, UA Negative      Glucose, UA Negative      Ketones, UA Negative      Bilirubin (UA) Negative      Occult Blood UA Negative      Nitrite, UA Negative      Urobilinogen, UA Negative      Leukocytes, UA Trace (*)     Narrative:     Specimen Source->Urine   URINALYSIS MICROSCOPIC    WBC, UA 1      Bacteria Occasional      Squam Epithel, UA 2      Microscopic Comment SEE COMMENT      Narrative:     Specimen Source->Urine     EKG Readings: (Independently Interpreted)    EKG revealed normal sinus rhythm with a rate of 78.  No STEMI.  Pr interval 142.  .  Normal axis.  No previous EKGs to compare to.  EKG signed by Dr. Landeros.       Imaging Results    None          Medications - No data to display  Medical Decision Making  This is a 24-year-old female approximately 18 weeks gravid A0 presents to ED for emergent evaluation of left-sided rib pain for the past few days.  She denies any associated trauma, fall, head trauma, LOC. she notices the pain with certain movements and deep inhalation.  She denies any recent long distance travel, surgeries, hormone therapy.  She denies any fever, chills, chest pain, shortness of breath, abdominal pain, hemoptysis,  nausea, vomiting, diarrhea, leg swelling, dysuria, hematuria, vaginal bleeding, vaginal discharge.  No other symptoms reported.      On physical exam, patient is well-appearing and in no acute distress.  Nontoxic appearing.  Lungs are clear to auscultation bilaterally.  Abdomen is soft and nontender.  No guarding, rigidity, rebound.  2+ radial pulses bilaterally.  Posterior oropharynx is not erythematous.  No edema or exudate.  Uvula midline.  Bilateral tympanic membrane is normal.  No erythema, bulging, or perforations.  Neuro intact.  Strength and sensation intact bilateral upper and lower extremities.  No midline tenderness to cervical, thoracic, lumbar spine.  No bony step-offs.  No CVA tenderness bilaterally.  No pitting edema to bilateral lower extremities.  Negative Homans sign bilaterally. EKG revealed normal sinus rhythm with a rate of 78.  No STEMI.  Pr interval 142.  .  Normal axis.  No previous EKGs to compare to.  EKG signed by Dr. Landeros.  Pain is reproducible with movement.  She has not have any shortness of breath, chest pain, or hemoptysis.  She denies any recent long distance travel, surgeries, hormone therapy.  UA with trace leukocytes, 1 white blood cell, 2 squamous epithelial cells.  Patient has not had any urinary complaints.  I believe patient's symptoms are musculoskeletal in nature.  Gave the patient strict return precautions if she does start exhibiting chest pain, shortness of breath, hemoptysis, leg swelling, fever, etc. respond follow with OBGYN and PCP for further evaluation.  Advised patient to take Tylenol as needed for pain at home.    Strict return precautions given. I discussed with the patient/family the diagnosis, treatment plan, indications for return to the emergency department, and for expected follow-up. The patient/family verbalized an understanding. The patient/family is asked if there are any questions or concerns. We discuss the case, until all issues are  addressed to the patient/family's satisfaction. Patient/family understands and is agreeable to the plan. Patient is stable and ready for discharge.                                        Clinical Impression:  Final diagnoses:  [R07.81] Rib pain (Primary)          ED Disposition Condition    Discharge Stable          ED Prescriptions    None       Follow-up Information       Follow up With Specialties Details Why Contact Info    Vianney Salgado, [facility] In 2 days for further evaluation [address] [place] [address]  [place] [address] [phone]      [facility] [place] [facility] In 2 days If symptoms worsen [address] [facility] [place] [id number] [phone]             Kenji Curry PA-C  01/06/25 1942

## 2025-01-06 NOTE — ED TRIAGE NOTES
Pt presents to ED via POV with c/o pain to LUQ x3 days. Reports worse with movement. Denies injury/trauma, SOB, CP, vaginal bleeding/discharge. Appx 17 weeks pregnant.

## 2025-01-06 NOTE — DISCHARGE INSTRUCTIONS
Please return to the Emergency Department for any new or worsening symptoms including: fever, chest pain, shortness of breath, loss of consciousness, dizziness, weakness, or any other concerns.     Please follow up with your Primary Care Provider within in the week. If you do not have one, you may contact the one listed on your discharge paperwork or you may also call the [facility] Appointment Desk at [phone] to schedule an appointment with one.

## 2025-01-07 LAB
OHS QRS DURATION: 74 MS
OHS QTC CALCULATION: 444 MS

## 2025-01-14 ENCOUNTER — PROCEDURE VISIT (OUTPATIENT)
Dept: MATERNAL FETAL MEDICINE | Facility: CLINIC | Age: 25
End: 2025-01-14
Payer: MEDICAID

## 2025-01-14 DIAGNOSIS — Z36.89 ENCOUNTER FOR FETAL ANATOMIC SURVEY: ICD-10-CM

## 2025-01-14 PROCEDURE — 76805 OB US >/= 14 WKS SNGL FETUS: CPT | Mod: PBBFAC | Performed by: OBSTETRICS & GYNECOLOGY

## 2025-01-21 ENCOUNTER — PATIENT MESSAGE (OUTPATIENT)
Dept: OTHER | Facility: OTHER | Age: 25
End: 2025-01-21
Payer: MEDICAID

## 2025-02-18 ENCOUNTER — PATIENT MESSAGE (OUTPATIENT)
Dept: OTHER | Facility: OTHER | Age: 25
End: 2025-02-18
Payer: MEDICAID

## 2025-02-26 ENCOUNTER — ROUTINE PRENATAL (OUTPATIENT)
Dept: OBSTETRICS AND GYNECOLOGY | Facility: CLINIC | Age: 25
End: 2025-02-26
Payer: MEDICAID

## 2025-02-26 VITALS
BODY MASS INDEX: 32.34 KG/M2 | DIASTOLIC BLOOD PRESSURE: 70 MMHG | SYSTOLIC BLOOD PRESSURE: 120 MMHG | WEIGHT: 176.81 LBS

## 2025-02-26 DIAGNOSIS — Z34.92 NORMAL PREGNANCY, SECOND TRIMESTER: Primary | ICD-10-CM

## 2025-02-26 DIAGNOSIS — K21.9 GASTROESOPHAGEAL REFLUX DURING PREGNANCY IN SECOND TRIMESTER, ANTEPARTUM: ICD-10-CM

## 2025-02-26 DIAGNOSIS — O99.612 GASTROESOPHAGEAL REFLUX DURING PREGNANCY IN SECOND TRIMESTER, ANTEPARTUM: ICD-10-CM

## 2025-02-26 DIAGNOSIS — Z3A.25 25 WEEKS GESTATION OF PREGNANCY: ICD-10-CM

## 2025-02-26 PROCEDURE — 99999 PR PBB SHADOW E&M-EST. PATIENT-LVL II: CPT | Mod: PBBFAC,,, | Performed by: OBSTETRICS & GYNECOLOGY

## 2025-02-26 PROCEDURE — 99212 OFFICE O/P EST SF 10 MIN: CPT | Mod: PBBFAC,TH | Performed by: OBSTETRICS & GYNECOLOGY

## 2025-02-26 RX ORDER — FAMOTIDINE 20 MG/1
20 TABLET, FILM COATED ORAL 2 TIMES DAILY
Qty: 60 TABLET | Refills: 4 | Status: SHIPPED | OUTPATIENT
Start: 2025-02-26 | End: 2026-02-26

## 2025-02-26 NOTE — PROGRESS NOTES
25w1d   . Pt reports No major complaints.   Denies contractions, vaginal bleeding, or leakage of fluid.    Trisomy screening:CFDNA normal  F/U 2 weeks. Next visit: routine care      Total time spent on visit was 20 minutes.  This includes face to face time and non-face to face time preparing to see the patient (eg, review of tests), Obtaining and/or reviewing separately obtained history, Documenting clinical information in the electronic or other health record, Independently interpreting resultsand communicating results to the patient/family/caregiver, or Care coordination.

## 2025-03-04 ENCOUNTER — PATIENT MESSAGE (OUTPATIENT)
Dept: OTHER | Facility: OTHER | Age: 25
End: 2025-03-04
Payer: MEDICAID

## 2025-03-14 ENCOUNTER — LAB VISIT (OUTPATIENT)
Dept: LAB | Facility: HOSPITAL | Age: 25
End: 2025-03-14
Attending: OBSTETRICS & GYNECOLOGY
Payer: MEDICAID

## 2025-03-14 ENCOUNTER — ROUTINE PRENATAL (OUTPATIENT)
Dept: OBSTETRICS AND GYNECOLOGY | Facility: CLINIC | Age: 25
End: 2025-03-14
Payer: MEDICAID

## 2025-03-14 VITALS — DIASTOLIC BLOOD PRESSURE: 60 MMHG | BODY MASS INDEX: 32.74 KG/M2 | WEIGHT: 179 LBS | SYSTOLIC BLOOD PRESSURE: 100 MMHG

## 2025-03-14 DIAGNOSIS — Z34.93 NORMAL PREGNANCY, THIRD TRIMESTER: Primary | ICD-10-CM

## 2025-03-14 DIAGNOSIS — Z34.93 NORMAL PREGNANCY, THIRD TRIMESTER: ICD-10-CM

## 2025-03-14 DIAGNOSIS — Z3A.27 27 WEEKS GESTATION OF PREGNANCY: ICD-10-CM

## 2025-03-14 LAB
BASOPHILS # BLD AUTO: 0.03 K/UL (ref 0–0.2)
BASOPHILS NFR BLD: 0.3 % (ref 0–1.9)
DIFFERENTIAL METHOD BLD: ABNORMAL
EOSINOPHIL # BLD AUTO: 0 K/UL (ref 0–0.5)
EOSINOPHIL NFR BLD: 0.4 % (ref 0–8)
ERYTHROCYTE [DISTWIDTH] IN BLOOD BY AUTOMATED COUNT: 13 % (ref 11.5–14.5)
GLUCOSE SERPL-MCNC: 130 MG/DL (ref 70–140)
HBV SURFACE AG SERPL QL IA: NORMAL
HCT VFR BLD AUTO: 36.1 % (ref 37–48.5)
HCV AB SERPL QL IA: NORMAL
HGB BLD-MCNC: 12.2 G/DL (ref 12–16)
HIV 1+2 AB+HIV1 P24 AG SERPL QL IA: NORMAL
IMM GRANULOCYTES # BLD AUTO: 0.04 K/UL (ref 0–0.04)
IMM GRANULOCYTES NFR BLD AUTO: 0.4 % (ref 0–0.5)
LYMPHOCYTES # BLD AUTO: 1.8 K/UL (ref 1–4.8)
LYMPHOCYTES NFR BLD: 17.6 % (ref 18–48)
MCH RBC QN AUTO: 31 PG (ref 27–31)
MCHC RBC AUTO-ENTMCNC: 33.8 G/DL (ref 32–36)
MCV RBC AUTO: 92 FL (ref 82–98)
MONOCYTES # BLD AUTO: 0.5 K/UL (ref 0.3–1)
MONOCYTES NFR BLD: 5 % (ref 4–15)
NEUTROPHILS # BLD AUTO: 7.9 K/UL (ref 1.8–7.7)
NEUTROPHILS NFR BLD: 76.3 % (ref 38–73)
NRBC BLD-RTO: 0 /100 WBC
PLATELET # BLD AUTO: 298 K/UL (ref 150–450)
PMV BLD AUTO: 9.2 FL (ref 9.2–12.9)
RBC # BLD AUTO: 3.93 M/UL (ref 4–5.4)
TREPONEMA PALLIDUM IGG+IGM AB [PRESENCE] IN SERUM OR PLASMA BY IMMUNOASSAY: NONREACTIVE
WBC # BLD AUTO: 10.34 K/UL (ref 3.9–12.7)

## 2025-03-14 PROCEDURE — 87389 HIV-1 AG W/HIV-1&-2 AB AG IA: CPT | Performed by: OBSTETRICS & GYNECOLOGY

## 2025-03-14 PROCEDURE — 99212 OFFICE O/P EST SF 10 MIN: CPT | Mod: PBBFAC,TH | Performed by: OBSTETRICS & GYNECOLOGY

## 2025-03-14 PROCEDURE — 82950 GLUCOSE TEST: CPT | Performed by: OBSTETRICS & GYNECOLOGY

## 2025-03-14 PROCEDURE — 36415 COLL VENOUS BLD VENIPUNCTURE: CPT | Performed by: OBSTETRICS & GYNECOLOGY

## 2025-03-14 PROCEDURE — 85025 COMPLETE CBC W/AUTO DIFF WBC: CPT | Performed by: OBSTETRICS & GYNECOLOGY

## 2025-03-14 PROCEDURE — 86593 SYPHILIS TEST NON-TREP QUANT: CPT | Performed by: OBSTETRICS & GYNECOLOGY

## 2025-03-14 PROCEDURE — 86803 HEPATITIS C AB TEST: CPT | Performed by: OBSTETRICS & GYNECOLOGY

## 2025-03-14 PROCEDURE — 99999 PR PBB SHADOW E&M-EST. PATIENT-LVL II: CPT | Mod: PBBFAC,,, | Performed by: OBSTETRICS & GYNECOLOGY

## 2025-03-14 PROCEDURE — 87340 HEPATITIS B SURFACE AG IA: CPT | Performed by: OBSTETRICS & GYNECOLOGY

## 2025-03-14 NOTE — PROGRESS NOTES
27w3d    . Pt reports No major complaints.   Denies contractions, vaginal bleeding, or leakage of fluid.    Trisomy screening:CFDNA normal  28 wk labs ordred  F/U 2 weeks. Next visit: routine care      Total time spent on visit was 20 minutes.  This includes face to face time and non-face to face time preparing to see the patient (eg, review of tests), Obtaining and/or reviewing separately obtained history, Documenting clinical information in the electronic or other health record, Independently interpreting resultsand communicating results to the patient/family/caregiver, or Care coordination.

## 2025-03-18 ENCOUNTER — PATIENT MESSAGE (OUTPATIENT)
Dept: OTHER | Facility: OTHER | Age: 25
End: 2025-03-18
Payer: MEDICAID

## 2025-04-01 ENCOUNTER — PATIENT MESSAGE (OUTPATIENT)
Dept: OTHER | Facility: OTHER | Age: 25
End: 2025-04-01
Payer: MEDICAID

## 2025-04-04 ENCOUNTER — CLINICAL SUPPORT (OUTPATIENT)
Dept: OBSTETRICS AND GYNECOLOGY | Facility: CLINIC | Age: 25
End: 2025-04-04
Payer: MEDICAID

## 2025-04-04 ENCOUNTER — ROUTINE PRENATAL (OUTPATIENT)
Dept: OBSTETRICS AND GYNECOLOGY | Facility: CLINIC | Age: 25
End: 2025-04-04
Payer: MEDICAID

## 2025-04-04 VITALS
DIASTOLIC BLOOD PRESSURE: 60 MMHG | WEIGHT: 180.31 LBS | BODY MASS INDEX: 32.98 KG/M2 | SYSTOLIC BLOOD PRESSURE: 120 MMHG

## 2025-04-04 DIAGNOSIS — Z34.93 NORMAL PREGNANCY, THIRD TRIMESTER: Primary | ICD-10-CM

## 2025-04-04 DIAGNOSIS — Z3A.30 30 WEEKS GESTATION OF PREGNANCY: ICD-10-CM

## 2025-04-04 DIAGNOSIS — Z3A.30 30 WEEKS GESTATION OF PREGNANCY: Primary | ICD-10-CM

## 2025-04-04 PROCEDURE — 99212 OFFICE O/P EST SF 10 MIN: CPT | Mod: PBBFAC,TH | Performed by: OBSTETRICS & GYNECOLOGY

## 2025-04-04 PROCEDURE — 99999 PR PBB SHADOW E&M-EST. PATIENT-LVL II: CPT | Mod: PBBFAC,,, | Performed by: OBSTETRICS & GYNECOLOGY

## 2025-04-04 NOTE — PROGRESS NOTES
30w3d     . Pt reports No major complaints.   Denies contractions, vaginal bleeding, or leakage of fluid.    Trisomy screening:CFDNA normal  Initial Anatomy ultrasound performed by [facility] on 1/14/25.   Declines TDAP.  Delivery consents signed 4/4/2025   F/U 2 weeks. Next visit: routine care      Total time spent on visit was 20 minutes.  This includes face to face time and non-face to face time preparing to see the patient (eg, review of tests), Obtaining and/or reviewing separately obtained history, Documenting clinical information in the electronic or other health record, Independently interpreting resultsand communicating results to the patient/family/caregiver, or Care coordination.

## 2025-04-04 NOTE — PROGRESS NOTES
NST completed in prenatal testing clinic due to decreased FM. NST reactive and reassuring. Patient denies cxtn, leaking of fluid or vaginal bleeding.        04/04/25 0921   Non Stress Test - General   Indications/Pt Reported Complaint decreased FM   Test Duration (min) 20 min   Number of Fetuses 1   Acoustic Stimulator No   Contractions Not present   Nonstress Test Baby A   Variability Moderate   Decels None   Accels Present   Baseline    Interpretation Baby A   Nonstress Test Interpretation Reactive   Overall Impression Reassuring

## 2025-04-15 ENCOUNTER — PATIENT MESSAGE (OUTPATIENT)
Dept: OTHER | Facility: OTHER | Age: 25
End: 2025-04-15
Payer: MEDICAID

## 2025-04-17 ENCOUNTER — PATIENT MESSAGE (OUTPATIENT)
Dept: OBSTETRICS AND GYNECOLOGY | Facility: CLINIC | Age: 25
End: 2025-04-17

## 2025-04-17 ENCOUNTER — ROUTINE PRENATAL (OUTPATIENT)
Dept: OBSTETRICS AND GYNECOLOGY | Facility: CLINIC | Age: 25
End: 2025-04-17
Payer: MEDICAID

## 2025-04-17 VITALS
SYSTOLIC BLOOD PRESSURE: 120 MMHG | BODY MASS INDEX: 33.55 KG/M2 | WEIGHT: 183.44 LBS | DIASTOLIC BLOOD PRESSURE: 70 MMHG

## 2025-04-17 DIAGNOSIS — Z34.93 NORMAL PREGNANCY, THIRD TRIMESTER: Primary | ICD-10-CM

## 2025-04-17 DIAGNOSIS — Z3A.32 32 WEEKS GESTATION OF PREGNANCY: ICD-10-CM

## 2025-04-17 PROCEDURE — 99213 OFFICE O/P EST LOW 20 MIN: CPT | Mod: TH,S$PBB,, | Performed by: OBSTETRICS & GYNECOLOGY

## 2025-04-17 PROCEDURE — 99999 PR PBB SHADOW E&M-EST. PATIENT-LVL II: CPT | Mod: PBBFAC,,, | Performed by: OBSTETRICS & GYNECOLOGY

## 2025-04-17 PROCEDURE — 99212 OFFICE O/P EST SF 10 MIN: CPT | Mod: PBBFAC,TH | Performed by: OBSTETRICS & GYNECOLOGY

## 2025-04-17 NOTE — PROGRESS NOTES
32w2d      . Pt reports No major complaints.   Denies contractions, vaginal bleeding, or leakage of fluid.    Trisomy screening:CFDNA normal  Initial Anatomy ultrasound performed by [facility] on 1/14/25.   Declines TDAP.  Delivery consents signed 4/4/2025   F/U 2 weeks. Next visit: routine care      Total time spent on visit was 20 minutes.  This includes face to face time and non-face to face time preparing to see the patient (eg, review of tests), Obtaining and/or reviewing separately obtained history, Documenting clinical information in the electronic or other health record, Independently interpreting resultsand communicating results to the patient/family/caregiver, or Care coordination.

## 2025-05-06 ENCOUNTER — PATIENT MESSAGE (OUTPATIENT)
Dept: OTHER | Facility: OTHER | Age: 25
End: 2025-05-06
Payer: MEDICAID

## 2025-05-16 ENCOUNTER — ROUTINE PRENATAL (OUTPATIENT)
Dept: OBSTETRICS AND GYNECOLOGY | Facility: CLINIC | Age: 25
End: 2025-05-16
Payer: MEDICAID

## 2025-05-16 VITALS
BODY MASS INDEX: 34.72 KG/M2 | SYSTOLIC BLOOD PRESSURE: 118 MMHG | WEIGHT: 189.81 LBS | DIASTOLIC BLOOD PRESSURE: 80 MMHG

## 2025-05-16 DIAGNOSIS — O26.843 UTERINE SIZE DATE DISCREPANCY PREGNANCY, THIRD TRIMESTER: ICD-10-CM

## 2025-05-16 DIAGNOSIS — Z34.93 NORMAL PREGNANCY, THIRD TRIMESTER: Primary | ICD-10-CM

## 2025-05-16 PROCEDURE — 99999 PR PBB SHADOW E&M-EST. PATIENT-LVL II: CPT | Mod: PBBFAC,,, | Performed by: OBSTETRICS & GYNECOLOGY

## 2025-05-16 PROCEDURE — 99212 OFFICE O/P EST SF 10 MIN: CPT | Mod: PBBFAC,TH | Performed by: OBSTETRICS & GYNECOLOGY

## 2025-05-16 NOTE — PROGRESS NOTES
36w3d       . Pt reports No major complaints.   Denies contractions, vaginal bleeding, or leakage of fluid.    Trisomy screening:CFDNA normal  Initial Anatomy ultrasound performed by [facility] on 1/14/25.   Declines TDAP.  Delivery consents signed 4/4/2025   F/U 1 week. Next visit: routine care      Total time spent on visit was 20 minutes.  This includes face to face time and non-face to face time preparing to see the patient (eg, review of tests), Obtaining and/or reviewing separately obtained history, Documenting clinical information in the electronic or other health record, Independently interpreting resultsand communicating results to the patient/family/caregiver, or Care coordination.

## 2025-05-19 ENCOUNTER — PROCEDURE VISIT (OUTPATIENT)
Dept: MATERNAL FETAL MEDICINE | Facility: CLINIC | Age: 25
End: 2025-05-19
Payer: MEDICAID

## 2025-05-19 DIAGNOSIS — O26.843 UTERINE SIZE DATE DISCREPANCY PREGNANCY, THIRD TRIMESTER: ICD-10-CM

## 2025-05-19 PROCEDURE — 76816 OB US FOLLOW-UP PER FETUS: CPT | Mod: PBBFAC | Performed by: OBSTETRICS & GYNECOLOGY

## 2025-05-23 ENCOUNTER — ROUTINE PRENATAL (OUTPATIENT)
Dept: OBSTETRICS AND GYNECOLOGY | Facility: CLINIC | Age: 25
End: 2025-05-23
Payer: MEDICAID

## 2025-05-23 VITALS
WEIGHT: 189.63 LBS | DIASTOLIC BLOOD PRESSURE: 80 MMHG | SYSTOLIC BLOOD PRESSURE: 120 MMHG | BODY MASS INDEX: 34.68 KG/M2

## 2025-05-23 DIAGNOSIS — Z34.93 NORMAL PREGNANCY, THIRD TRIMESTER: Primary | ICD-10-CM

## 2025-05-23 DIAGNOSIS — Z3A.37 37 WEEKS GESTATION OF PREGNANCY: ICD-10-CM

## 2025-05-23 PROCEDURE — 99999 PR PBB SHADOW E&M-EST. PATIENT-LVL II: CPT | Mod: PBBFAC,,, | Performed by: OBSTETRICS & GYNECOLOGY

## 2025-05-23 PROCEDURE — 99212 OFFICE O/P EST SF 10 MIN: CPT | Mod: PBBFAC,TH | Performed by: OBSTETRICS & GYNECOLOGY

## 2025-05-23 NOTE — PROGRESS NOTES
37w3d        . Pt reports No major complaints.   Denies contractions, vaginal bleeding, or leakage of fluid.    Trisomy screening:CFDNA normal  Initial Anatomy ultrasound performed by [facility] on 1/14/25.   Declines TDAP.  Delivery consents signed 4/4/2025   F/U 1 week. Next visit: routine care  CVX 1/50/-3    Total time spent on visit was 20 minutes.  This includes face to face time and non-face to face time preparing to see the patient (eg, review of tests), Obtaining and/or reviewing separately obtained history, Documenting clinical information in the electronic or other health record, Independently interpreting resultsand communicating results to the patient/family/caregiver, or Care coordination.

## 2025-05-27 ENCOUNTER — ROUTINE PRENATAL (OUTPATIENT)
Dept: OBSTETRICS AND GYNECOLOGY | Facility: CLINIC | Age: 25
End: 2025-05-27
Payer: MEDICAID

## 2025-05-27 VITALS — SYSTOLIC BLOOD PRESSURE: 120 MMHG | WEIGHT: 190.25 LBS | BODY MASS INDEX: 34.8 KG/M2 | DIASTOLIC BLOOD PRESSURE: 80 MMHG

## 2025-05-27 DIAGNOSIS — K21.9 GASTROESOPHAGEAL REFLUX DURING PREGNANCY IN SECOND TRIMESTER, ANTEPARTUM: ICD-10-CM

## 2025-05-27 DIAGNOSIS — Z34.93 NORMAL PREGNANCY, THIRD TRIMESTER: Primary | ICD-10-CM

## 2025-05-27 DIAGNOSIS — Z3A.38 38 WEEKS GESTATION OF PREGNANCY: ICD-10-CM

## 2025-05-27 DIAGNOSIS — O99.612 GASTROESOPHAGEAL REFLUX DURING PREGNANCY IN SECOND TRIMESTER, ANTEPARTUM: ICD-10-CM

## 2025-05-27 PROCEDURE — 99213 OFFICE O/P EST LOW 20 MIN: CPT | Mod: TH,S$PBB,, | Performed by: OBSTETRICS & GYNECOLOGY

## 2025-05-27 PROCEDURE — 99212 OFFICE O/P EST SF 10 MIN: CPT | Mod: PBBFAC,TH | Performed by: OBSTETRICS & GYNECOLOGY

## 2025-05-27 PROCEDURE — 99999 PR PBB SHADOW E&M-EST. PATIENT-LVL II: CPT | Mod: PBBFAC,,, | Performed by: OBSTETRICS & GYNECOLOGY

## 2025-05-27 RX ORDER — FAMOTIDINE 20 MG/1
20 TABLET, FILM COATED ORAL 2 TIMES DAILY
Qty: 60 TABLET | Refills: 4 | Status: SHIPPED | OUTPATIENT
Start: 2025-05-27 | End: 2026-05-27

## 2025-05-27 NOTE — PROGRESS NOTES
38w0d         . Pt reports No major complaints.   Denies contractions, vaginal bleeding, or leakage of fluid.    Trisomy screening:CFDNA normal  Initial Anatomy ultrasound performed by [facility] on 1/14/25.   Declines TDAP.  Delivery consents signed 4/4/2025   F/U 1 week. Next visit: routine care  CVX 2/50/-3    Total time spent on visit was 20 minutes.  This includes face to face time and non-face to face time preparing to see the patient (eg, review of tests), Obtaining and/or reviewing separately obtained history, Documenting clinical information in the electronic or other health record, Independently interpreting resultsand communicating results to the patient/family/caregiver, or Care coordination.

## 2025-06-02 ENCOUNTER — HOSPITAL ENCOUNTER (INPATIENT)
Facility: HOSPITAL | Age: 25
LOS: 3 days | Discharge: HOME | End: 2025-06-05
Attending: OBSTETRICS & GYNECOLOGY | Admitting: OBSTETRICS & GYNECOLOGY
Payer: MEDICAID

## 2025-06-02 ENCOUNTER — PATIENT MESSAGE (OUTPATIENT)
Dept: OBSTETRICS AND GYNECOLOGY | Facility: CLINIC | Age: 25
End: 2025-06-02
Payer: MEDICAID

## 2025-06-02 DIAGNOSIS — Z34.90 ENCOUNTER FOR ELECTIVE INDUCTION OF LABOR: ICD-10-CM

## 2025-06-02 DIAGNOSIS — Z34.93 NORMAL PREGNANCY, THIRD TRIMESTER: ICD-10-CM

## 2025-06-02 DIAGNOSIS — Z34.93 NORMAL PREGNANCY, THIRD TRIMESTER: Primary | ICD-10-CM

## 2025-06-02 PROCEDURE — 72100002 HC LABOR CARE, 1ST 8 HOURS

## 2025-06-02 PROCEDURE — 59025 FETAL NON-STRESS TEST: CPT

## 2025-06-02 PROCEDURE — 11000001 HC ACUTE MED/SURG PRIVATE ROOM

## 2025-06-02 RX ORDER — METHYLERGONOVINE MALEATE 0.2 MG/ML
200 INJECTION INTRAVENOUS ONCE AS NEEDED
Status: DISCONTINUED | OUTPATIENT
Start: 2025-06-02 | End: 2025-06-03

## 2025-06-02 RX ORDER — CARBOPROST TROMETHAMINE 250 UG/ML
250 INJECTION, SOLUTION INTRAMUSCULAR
Status: CANCELLED | OUTPATIENT
Start: 2025-06-02

## 2025-06-02 RX ORDER — MUPIROCIN 20 MG/G
OINTMENT TOPICAL
Status: DISCONTINUED | OUTPATIENT
Start: 2025-06-02 | End: 2025-06-03

## 2025-06-02 RX ORDER — SIMETHICONE 80 MG
1 TABLET,CHEWABLE ORAL 4 TIMES DAILY PRN
Status: DISCONTINUED | OUTPATIENT
Start: 2025-06-02 | End: 2025-06-03

## 2025-06-02 RX ORDER — MISOPROSTOL 200 UG/1
800 TABLET ORAL ONCE AS NEEDED
Status: DISCONTINUED | OUTPATIENT
Start: 2025-06-02 | End: 2025-06-03

## 2025-06-02 RX ORDER — METHYLERGONOVINE MALEATE 0.2 MG/ML
200 INJECTION INTRAVENOUS ONCE AS NEEDED
Status: CANCELLED | OUTPATIENT
Start: 2025-06-02 | End: 2036-10-29

## 2025-06-02 RX ORDER — CARBOPROST TROMETHAMINE 250 UG/ML
250 INJECTION, SOLUTION INTRAMUSCULAR
Status: DISCONTINUED | OUTPATIENT
Start: 2025-06-02 | End: 2025-06-03

## 2025-06-02 RX ORDER — OXYTOCIN-SODIUM CHLORIDE 0.9% IV SOLN 30 UNIT/500ML 30-0.9/5 UT/ML-%
10 SOLUTION INTRAVENOUS ONCE AS NEEDED
Status: CANCELLED | OUTPATIENT
Start: 2025-06-02 | End: 2036-10-29

## 2025-06-02 RX ORDER — OXYTOCIN/0.9 % SODIUM CHLORIDE 15/250 ML
0-32 PLASTIC BAG, INJECTION (ML) INTRAVENOUS CONTINUOUS
Status: CANCELLED | OUTPATIENT
Start: 2025-06-02

## 2025-06-02 RX ORDER — MUPIROCIN 20 MG/G
OINTMENT TOPICAL
Status: CANCELLED | OUTPATIENT
Start: 2025-06-02

## 2025-06-02 RX ORDER — ACETAMINOPHEN 325 MG/1
650 TABLET ORAL EVERY 6 HOURS PRN
Status: DISCONTINUED | OUTPATIENT
Start: 2025-06-02 | End: 2025-06-03

## 2025-06-02 RX ORDER — SODIUM CHLORIDE 9 MG/ML
INJECTION, SOLUTION INTRAVENOUS
Status: DISCONTINUED | OUTPATIENT
Start: 2025-06-02 | End: 2025-06-03

## 2025-06-02 RX ORDER — CALCIUM CARBONATE 200(500)MG
500 TABLET,CHEWABLE ORAL 3 TIMES DAILY PRN
Status: DISCONTINUED | OUTPATIENT
Start: 2025-06-02 | End: 2025-06-03

## 2025-06-02 RX ORDER — OXYTOCIN 10 [USP'U]/ML
10 INJECTION, SOLUTION INTRAMUSCULAR; INTRAVENOUS ONCE AS NEEDED
Status: DISCONTINUED | OUTPATIENT
Start: 2025-06-02 | End: 2025-06-03

## 2025-06-02 RX ORDER — OXYTOCIN-SODIUM CHLORIDE 0.9% IV SOLN 30 UNIT/500ML 30-0.9/5 UT/ML-%
95 SOLUTION INTRAVENOUS ONCE AS NEEDED
Status: DISCONTINUED | OUTPATIENT
Start: 2025-06-02 | End: 2025-06-03

## 2025-06-02 RX ORDER — SODIUM CHLORIDE 0.9 % (FLUSH) 0.9 %
10 SYRINGE (ML) INJECTION
Status: DISCONTINUED | OUTPATIENT
Start: 2025-06-02 | End: 2025-06-03

## 2025-06-02 RX ORDER — BUTORPHANOL TARTRATE 1 MG/ML
1 INJECTION INTRAMUSCULAR; INTRAVENOUS
Status: CANCELLED | OUTPATIENT
Start: 2025-06-02

## 2025-06-02 RX ORDER — OXYTOCIN-SODIUM CHLORIDE 0.9% IV SOLN 30 UNIT/500ML 30-0.9/5 UT/ML-%
95 SOLUTION INTRAVENOUS CONTINUOUS PRN
Status: DISCONTINUED | OUTPATIENT
Start: 2025-06-02 | End: 2025-06-03

## 2025-06-02 RX ORDER — OXYTOCIN-SODIUM CHLORIDE 0.9% IV SOLN 30 UNIT/500ML 30-0.9/5 UT/ML-%
10 SOLUTION INTRAVENOUS ONCE AS NEEDED
Status: DISCONTINUED | OUTPATIENT
Start: 2025-06-02 | End: 2025-06-03

## 2025-06-02 RX ORDER — SODIUM CHLORIDE 0.9 % (FLUSH) 0.9 %
10 SYRINGE (ML) INJECTION
Status: CANCELLED | OUTPATIENT
Start: 2025-06-02

## 2025-06-02 RX ORDER — DIPHENOXYLATE HYDROCHLORIDE AND ATROPINE SULFATE 2.5; .025 MG/1; MG/1
2 TABLET ORAL EVERY 6 HOURS PRN
Status: CANCELLED | OUTPATIENT
Start: 2025-06-02

## 2025-06-02 RX ORDER — OXYTOCIN-SODIUM CHLORIDE 0.9% IV SOLN 30 UNIT/500ML 30-0.9/5 UT/ML-%
30 SOLUTION INTRAVENOUS ONCE AS NEEDED
Status: DISCONTINUED | OUTPATIENT
Start: 2025-06-02 | End: 2025-06-03

## 2025-06-02 RX ORDER — CALCIUM CARBONATE 200(500)MG
500 TABLET,CHEWABLE ORAL 3 TIMES DAILY PRN
Status: CANCELLED | OUTPATIENT
Start: 2025-06-02

## 2025-06-02 RX ORDER — BUTORPHANOL TARTRATE 2 MG/ML
2 INJECTION INTRAMUSCULAR; INTRAVENOUS
Status: CANCELLED | OUTPATIENT
Start: 2025-06-02

## 2025-06-02 RX ORDER — SODIUM CHLORIDE, SODIUM LACTATE, POTASSIUM CHLORIDE, CALCIUM CHLORIDE 600; 310; 30; 20 MG/100ML; MG/100ML; MG/100ML; MG/100ML
INJECTION, SOLUTION INTRAVENOUS CONTINUOUS
Status: CANCELLED | OUTPATIENT
Start: 2025-06-02

## 2025-06-02 RX ORDER — ONDANSETRON 8 MG/1
8 TABLET, ORALLY DISINTEGRATING ORAL EVERY 8 HOURS PRN
Status: CANCELLED | OUTPATIENT
Start: 2025-06-02

## 2025-06-02 RX ORDER — SIMETHICONE 80 MG
1 TABLET,CHEWABLE ORAL 4 TIMES DAILY PRN
Status: CANCELLED | OUTPATIENT
Start: 2025-06-02

## 2025-06-02 RX ORDER — DIPHENOXYLATE HYDROCHLORIDE AND ATROPINE SULFATE 2.5; .025 MG/1; MG/1
2 TABLET ORAL EVERY 6 HOURS PRN
Status: DISCONTINUED | OUTPATIENT
Start: 2025-06-02 | End: 2025-06-03

## 2025-06-02 RX ORDER — ONDANSETRON 8 MG/1
8 TABLET, ORALLY DISINTEGRATING ORAL EVERY 8 HOURS PRN
Status: DISCONTINUED | OUTPATIENT
Start: 2025-06-02 | End: 2025-06-03

## 2025-06-02 RX ORDER — OXYTOCIN 10 [USP'U]/ML
10 INJECTION, SOLUTION INTRAMUSCULAR; INTRAVENOUS ONCE AS NEEDED
Status: CANCELLED | OUTPATIENT
Start: 2025-06-02 | End: 2036-10-29

## 2025-06-02 RX ORDER — MISOPROSTOL 200 UG/1
800 TABLET ORAL ONCE AS NEEDED
Status: CANCELLED | OUTPATIENT
Start: 2025-06-02 | End: 2036-10-29

## 2025-06-02 RX ORDER — ACETAMINOPHEN 325 MG/1
650 TABLET ORAL EVERY 6 HOURS PRN
Status: CANCELLED | OUTPATIENT
Start: 2025-06-02

## 2025-06-02 RX ORDER — MISOPROSTOL 200 UG/1
800 TABLET ORAL ONCE AS NEEDED
Status: CANCELLED | OUTPATIENT
Start: 2025-06-02

## 2025-06-02 RX ORDER — TRANEXAMIC ACID 10 MG/ML
1000 INJECTION, SOLUTION INTRAVENOUS EVERY 30 MIN PRN
Status: DISCONTINUED | OUTPATIENT
Start: 2025-06-02 | End: 2025-06-03

## 2025-06-02 RX ORDER — SODIUM CHLORIDE, SODIUM LACTATE, POTASSIUM CHLORIDE, CALCIUM CHLORIDE 600; 310; 30; 20 MG/100ML; MG/100ML; MG/100ML; MG/100ML
INJECTION, SOLUTION INTRAVENOUS CONTINUOUS
Status: DISCONTINUED | OUTPATIENT
Start: 2025-06-03 | End: 2025-06-03

## 2025-06-02 RX ORDER — OXYTOCIN-SODIUM CHLORIDE 0.9% IV SOLN 30 UNIT/500ML 30-0.9/5 UT/ML-%
30 SOLUTION INTRAVENOUS ONCE AS NEEDED
Status: CANCELLED | OUTPATIENT
Start: 2025-06-02 | End: 2036-10-29

## 2025-06-02 RX ORDER — BUTORPHANOL TARTRATE 2 MG/ML
1 INJECTION INTRAMUSCULAR; INTRAVENOUS
Status: DISCONTINUED | OUTPATIENT
Start: 2025-06-02 | End: 2025-06-03

## 2025-06-02 RX ORDER — OXYTOCIN/0.9 % SODIUM CHLORIDE 15/250 ML
95 PLASTIC BAG, INJECTION (ML) INTRAVENOUS CONTINUOUS PRN
Status: CANCELLED | OUTPATIENT
Start: 2025-06-02

## 2025-06-02 RX ORDER — LIDOCAINE HYDROCHLORIDE 10 MG/ML
10 INJECTION, SOLUTION INFILTRATION; PERINEURAL ONCE AS NEEDED
Status: DISCONTINUED | OUTPATIENT
Start: 2025-06-02 | End: 2025-06-03

## 2025-06-02 RX ORDER — LIDOCAINE HYDROCHLORIDE 10 MG/ML
10 INJECTION, SOLUTION INFILTRATION; PERINEURAL ONCE AS NEEDED
Status: CANCELLED | OUTPATIENT
Start: 2025-06-02 | End: 2036-10-29

## 2025-06-02 RX ORDER — OXYTOCIN/0.9 % SODIUM CHLORIDE 15/250 ML
95 PLASTIC BAG, INJECTION (ML) INTRAVENOUS ONCE AS NEEDED
Status: CANCELLED | OUTPATIENT
Start: 2025-06-02 | End: 2036-10-29

## 2025-06-02 RX ORDER — BUTORPHANOL TARTRATE 2 MG/ML
2 INJECTION INTRAMUSCULAR; INTRAVENOUS
Status: DISCONTINUED | OUTPATIENT
Start: 2025-06-02 | End: 2025-06-03

## 2025-06-02 RX ORDER — OXYTOCIN-SODIUM CHLORIDE 0.9% IV SOLN 30 UNIT/500ML 30-0.9/5 UT/ML-%
0-32 SOLUTION INTRAVENOUS CONTINUOUS
Status: DISCONTINUED | OUTPATIENT
Start: 2025-06-03 | End: 2025-06-03

## 2025-06-02 RX ORDER — SODIUM CHLORIDE 9 MG/ML
INJECTION, SOLUTION INTRAVENOUS
Status: CANCELLED | OUTPATIENT
Start: 2025-06-02

## 2025-06-03 ENCOUNTER — ANESTHESIA (OUTPATIENT)
Dept: OBSTETRICS AND GYNECOLOGY | Facility: HOSPITAL | Age: 25
End: 2025-06-03
Payer: MEDICAID

## 2025-06-03 ENCOUNTER — ANESTHESIA EVENT (OUTPATIENT)
Dept: OBSTETRICS AND GYNECOLOGY | Facility: HOSPITAL | Age: 25
End: 2025-06-03
Payer: MEDICAID

## 2025-06-03 LAB
ABSOLUTE EOSINOPHIL (OHS): 0.07 K/UL
ABSOLUTE MONOCYTE (OHS): 0.82 K/UL (ref 0.3–1)
ABSOLUTE NEUTROPHIL COUNT (OHS): 7.12 K/UL (ref 1.8–7.7)
BASOPHILS # BLD AUTO: 0.02 K/UL
BASOPHILS NFR BLD AUTO: 0.2 %
ERYTHROCYTE [DISTWIDTH] IN BLOOD BY AUTOMATED COUNT: 13.1 % (ref 11.5–14.5)
GROUP & RH: NORMAL
HCT VFR BLD AUTO: 34.4 % (ref 37–48.5)
HGB BLD-MCNC: 11.6 GM/DL (ref 12–16)
IMM GRANULOCYTES # BLD AUTO: 0.06 K/UL (ref 0–0.04)
IMM GRANULOCYTES NFR BLD AUTO: 0.6 % (ref 0–0.5)
INDIRECT COOMBS: NORMAL
LYMPHOCYTES # BLD AUTO: 2.52 K/UL (ref 1–4.8)
MCH RBC QN AUTO: 30.2 PG (ref 27–31)
MCHC RBC AUTO-ENTMCNC: 33.7 G/DL (ref 32–36)
MCV RBC AUTO: 90 FL (ref 82–98)
NUCLEATED RBC (/100WBC) (OHS): 0 /100 WBC
PLATELET # BLD AUTO: 233 K/UL (ref 150–450)
PMV BLD AUTO: 10.4 FL (ref 9.2–12.9)
RBC # BLD AUTO: 3.84 M/UL (ref 4–5.4)
RELATIVE EOSINOPHIL (OHS): 0.7 %
RELATIVE LYMPHOCYTE (OHS): 23.8 % (ref 18–48)
RELATIVE MONOCYTE (OHS): 7.7 % (ref 4–15)
RELATIVE NEUTROPHIL (OHS): 67 % (ref 38–73)
T PALLIDUM IGG+IGM SER QL: NORMAL
WBC # BLD AUTO: 10.61 K/UL (ref 3.9–12.7)

## 2025-06-03 PROCEDURE — 86900 BLOOD TYPING SEROLOGIC ABO: CPT | Performed by: OBSTETRICS & GYNECOLOGY

## 2025-06-03 PROCEDURE — 25000003 PHARM REV CODE 250: Performed by: ANESTHESIOLOGY

## 2025-06-03 PROCEDURE — 59409 OBSTETRICAL CARE: CPT | Mod: GB,,, | Performed by: OBSTETRICS & GYNECOLOGY

## 2025-06-03 PROCEDURE — 25000003 PHARM REV CODE 250: Performed by: OBSTETRICS & GYNECOLOGY

## 2025-06-03 PROCEDURE — C1751 CATH, INF, PER/CENT/MIDLINE: HCPCS | Performed by: ANESTHESIOLOGY

## 2025-06-03 PROCEDURE — 11000001 HC ACUTE MED/SURG PRIVATE ROOM

## 2025-06-03 PROCEDURE — 51702 INSERT TEMP BLADDER CATH: CPT

## 2025-06-03 PROCEDURE — 27200710 HC EPIDURAL INFUSION PUMP SET: Performed by: ANESTHESIOLOGY

## 2025-06-03 PROCEDURE — 86593 SYPHILIS TEST NON-TREP QUANT: CPT | Performed by: OBSTETRICS & GYNECOLOGY

## 2025-06-03 PROCEDURE — 72200005 HC VAGINAL DELIVERY LEVEL II

## 2025-06-03 PROCEDURE — 3E033VJ INTRODUCTION OF OTHER HORMONE INTO PERIPHERAL VEIN, PERCUTANEOUS APPROACH: ICD-10-PCS | Performed by: OBSTETRICS & GYNECOLOGY

## 2025-06-03 PROCEDURE — 10907ZC DRAINAGE OF AMNIOTIC FLUID, THERAPEUTIC FROM PRODUCTS OF CONCEPTION, VIA NATURAL OR ARTIFICIAL OPENING: ICD-10-PCS | Performed by: OBSTETRICS & GYNECOLOGY

## 2025-06-03 PROCEDURE — 62326 NJX INTERLAMINAR LMBR/SAC: CPT | Performed by: ANESTHESIOLOGY

## 2025-06-03 PROCEDURE — 63600175 PHARM REV CODE 636 W HCPCS: Performed by: OBSTETRICS & GYNECOLOGY

## 2025-06-03 PROCEDURE — 85025 COMPLETE CBC W/AUTO DIFF WBC: CPT | Performed by: OBSTETRICS & GYNECOLOGY

## 2025-06-03 RX ORDER — OXYTOCIN 10 [USP'U]/ML
10 INJECTION, SOLUTION INTRAMUSCULAR; INTRAVENOUS ONCE AS NEEDED
Status: DISCONTINUED | OUTPATIENT
Start: 2025-06-03 | End: 2025-06-05 | Stop reason: HOSPADM

## 2025-06-03 RX ORDER — PRENATAL WITH FERROUS FUM AND FOLIC ACID 3080; 920; 120; 400; 22; 1.84; 3; 20; 10; 1; 12; 200; 27; 25; 2 [IU]/1; [IU]/1; MG/1; [IU]/1; MG/1; MG/1; MG/1; MG/1; MG/1; MG/1; UG/1; MG/1; MG/1; MG/1; MG/1
1 TABLET ORAL DAILY
Status: DISCONTINUED | OUTPATIENT
Start: 2025-06-03 | End: 2025-06-05 | Stop reason: HOSPADM

## 2025-06-03 RX ORDER — MISOPROSTOL 200 UG/1
800 TABLET ORAL ONCE AS NEEDED
Status: DISCONTINUED | OUTPATIENT
Start: 2025-06-03 | End: 2025-06-05 | Stop reason: HOSPADM

## 2025-06-03 RX ORDER — HYDROCORTISONE 25 MG/G
CREAM TOPICAL 3 TIMES DAILY PRN
Status: DISCONTINUED | OUTPATIENT
Start: 2025-06-03 | End: 2025-06-05 | Stop reason: HOSPADM

## 2025-06-03 RX ORDER — CARBOPROST TROMETHAMINE 250 UG/ML
250 INJECTION, SOLUTION INTRAMUSCULAR
Status: DISCONTINUED | OUTPATIENT
Start: 2025-06-03 | End: 2025-06-05 | Stop reason: HOSPADM

## 2025-06-03 RX ORDER — TRANEXAMIC ACID 10 MG/ML
1000 INJECTION, SOLUTION INTRAVENOUS EVERY 30 MIN PRN
Status: DISCONTINUED | OUTPATIENT
Start: 2025-06-03 | End: 2025-06-05 | Stop reason: HOSPADM

## 2025-06-03 RX ORDER — OXYCODONE HYDROCHLORIDE 5 MG/1
5 TABLET ORAL EVERY 4 HOURS PRN
Status: DISCONTINUED | OUTPATIENT
Start: 2025-06-03 | End: 2025-06-05 | Stop reason: HOSPADM

## 2025-06-03 RX ORDER — DOCUSATE SODIUM 100 MG/1
200 CAPSULE, LIQUID FILLED ORAL 2 TIMES DAILY PRN
Status: DISCONTINUED | OUTPATIENT
Start: 2025-06-03 | End: 2025-06-05 | Stop reason: HOSPADM

## 2025-06-03 RX ORDER — SIMETHICONE 80 MG
1 TABLET,CHEWABLE ORAL EVERY 6 HOURS PRN
Status: DISCONTINUED | OUTPATIENT
Start: 2025-06-03 | End: 2025-06-05 | Stop reason: HOSPADM

## 2025-06-03 RX ORDER — OXYTOCIN-SODIUM CHLORIDE 0.9% IV SOLN 30 UNIT/500ML 30-0.9/5 UT/ML-%
30 SOLUTION INTRAVENOUS ONCE AS NEEDED
Status: DISCONTINUED | OUTPATIENT
Start: 2025-06-03 | End: 2025-06-05 | Stop reason: HOSPADM

## 2025-06-03 RX ORDER — METHYLERGONOVINE MALEATE 0.2 MG/ML
200 INJECTION INTRAVENOUS ONCE AS NEEDED
Status: DISCONTINUED | OUTPATIENT
Start: 2025-06-03 | End: 2025-06-05 | Stop reason: HOSPADM

## 2025-06-03 RX ORDER — OXYTOCIN-SODIUM CHLORIDE 0.9% IV SOLN 30 UNIT/500ML 30-0.9/5 UT/ML-%
95 SOLUTION INTRAVENOUS ONCE AS NEEDED
Status: DISCONTINUED | OUTPATIENT
Start: 2025-06-03 | End: 2025-06-05 | Stop reason: HOSPADM

## 2025-06-03 RX ORDER — OXYTOCIN-SODIUM CHLORIDE 0.9% IV SOLN 30 UNIT/500ML 30-0.9/5 UT/ML-%
95 SOLUTION INTRAVENOUS CONTINUOUS PRN
Status: DISCONTINUED | OUTPATIENT
Start: 2025-06-03 | End: 2025-06-05 | Stop reason: HOSPADM

## 2025-06-03 RX ORDER — SODIUM CHLORIDE 0.9 % (FLUSH) 0.9 %
10 SYRINGE (ML) INJECTION
Status: DISCONTINUED | OUTPATIENT
Start: 2025-06-03 | End: 2025-06-05 | Stop reason: HOSPADM

## 2025-06-03 RX ORDER — DIPHENHYDRAMINE HCL 25 MG
25 CAPSULE ORAL EVERY 4 HOURS PRN
Status: DISCONTINUED | OUTPATIENT
Start: 2025-06-03 | End: 2025-06-05 | Stop reason: HOSPADM

## 2025-06-03 RX ORDER — DIPHENHYDRAMINE HYDROCHLORIDE 50 MG/ML
25 INJECTION, SOLUTION INTRAMUSCULAR; INTRAVENOUS EVERY 4 HOURS PRN
Status: DISCONTINUED | OUTPATIENT
Start: 2025-06-03 | End: 2025-06-05 | Stop reason: HOSPADM

## 2025-06-03 RX ORDER — FENTANYL/BUPIVACAINE/NS/PF 2MCG/ML-.1
PLASTIC BAG, INJECTION (ML) INJECTION
Status: DISCONTINUED | OUTPATIENT
Start: 2025-06-03 | End: 2025-06-09

## 2025-06-03 RX ORDER — ACETAMINOPHEN 500 MG
1000 TABLET ORAL EVERY 6 HOURS PRN
Status: DISCONTINUED | OUTPATIENT
Start: 2025-06-03 | End: 2025-06-05 | Stop reason: HOSPADM

## 2025-06-03 RX ORDER — IBUPROFEN 600 MG/1
600 TABLET, FILM COATED ORAL EVERY 6 HOURS
Status: DISCONTINUED | OUTPATIENT
Start: 2025-06-03 | End: 2025-06-05 | Stop reason: HOSPADM

## 2025-06-03 RX ORDER — DIPHENOXYLATE HYDROCHLORIDE AND ATROPINE SULFATE 2.5; .025 MG/1; MG/1
2 TABLET ORAL EVERY 6 HOURS PRN
Status: DISCONTINUED | OUTPATIENT
Start: 2025-06-03 | End: 2025-06-05 | Stop reason: HOSPADM

## 2025-06-03 RX ORDER — ONDANSETRON 8 MG/1
8 TABLET, ORALLY DISINTEGRATING ORAL EVERY 8 HOURS PRN
Status: DISCONTINUED | OUTPATIENT
Start: 2025-06-03 | End: 2025-06-05 | Stop reason: HOSPADM

## 2025-06-03 RX ADMIN — IBUPROFEN 600 MG: 600 TABLET ORAL at 11:06

## 2025-06-03 RX ADMIN — IBUPROFEN 600 MG: 600 TABLET ORAL at 12:06

## 2025-06-03 RX ADMIN — Medication 10 ML/HR: at 08:06

## 2025-06-03 RX ADMIN — OXYTOCIN-SODIUM CHLORIDE 0.9% IV SOLN 30 UNIT/500ML 10 UNITS: 30-0.9/5 SOLUTION at 09:06

## 2025-06-03 RX ADMIN — SODIUM CHLORIDE, POTASSIUM CHLORIDE, SODIUM LACTATE AND CALCIUM CHLORIDE: 600; 310; 30; 20 INJECTION, SOLUTION INTRAVENOUS at 12:06

## 2025-06-03 RX ADMIN — IBUPROFEN 600 MG: 600 TABLET ORAL at 05:06

## 2025-06-03 RX ADMIN — ACETAMINOPHEN 1000 MG: 500 TABLET ORAL at 12:06

## 2025-06-03 RX ADMIN — Medication 2 ML: at 08:06

## 2025-06-03 RX ADMIN — PRENATAL VITAMINS-IRON FUMARATE 27 MG IRON-FOLIC ACID 0.8 MG TABLET 1 TABLET: at 12:06

## 2025-06-03 RX ADMIN — Medication 4 MILLI-UNITS/MIN: at 12:06

## 2025-06-03 NOTE — ANESTHESIA PROCEDURE NOTES
CSE    Patient location during procedure: OB  Start time: 6/3/2025 7:56 AM  Timeout: 6/3/2025 7:54 AM  End time: 6/3/2025 8:03 AM      Staffing  Authorizing Provider: Sheila Russell MD  Performing Provider: Sheila Russell MD    Staffing  Performed by: Sheila Russell MD  Authorized by: Sheila Russell MD    Preanesthetic Checklist  Completed: patient identified, IV checked, risks and benefits discussed, monitors and equipment checked, pre-op evaluation and timeout performed  CSE  Patient position: sitting  Prep: ChloraPrep  Patient monitoring: heart rate, continuous pulse ox and frequent blood pressure checks  Approach: midline  Spinal Needle  Needle type: Dillan   Needle gauge: 25 G  Needle length: 5 in  Epidural Needle  Injection technique: GEMA saline  Needle type: Tuohy   Needle gauge: 17 G  Needle length: 3.5 in  Needle insertion depth: 7 cm  Location: L4-5   Catheter  Catheter type: end hole  Catheter size: 19 G  Catheter at skin depth: 12 cm  Test dose: lidocaine 1.5% with Epi 1-to-200,000 and negative  Test dose: 5 mL  Additional Documentation: incremental injection, negative aspiration for CSF, negative aspiration for heme, no paresthesia on injection and negative test dose  Assessment  Sensory level: T10   Dermatomal levels determined by pinch or prick  Intrathecal Medications:   administered: primary anesthetic mcg of

## 2025-06-03 NOTE — HOSPITAL COURSE
6/3/2025  performed.  No lacerations.    2025 Postpartum  day # 1.  Doing well. Breast and bottle Feeding.  Pain well controlled. Normal lochia.  Ambulating and voiding without difficulty.  Tolerating a regular diet without nausea or vomiting.     2025 Postpartum  day # 2.  Doing well. Breast and Breast and bottle Feeding.  Pain well controlled. Normal lochia.  Ambulating and voiding without difficulty.  Tolerating a regular diet without nausea or vomiting.  Stable for discharge home.      Never smoker

## 2025-06-03 NOTE — NURSING
0705 Morning assessment done. Patient is  @ 39 Weeks 0 days. Patient denies any headaches, gastric pain or visual changes. Patient  reports +FM and denies feeling any pain with contractions. Patient  stated that she wants to wait until after she gets her water broke to get an epidural. LR bolus started. Patient denies any bleeding or feeling like her water broken. Patient denies any calf pain. Patient instructed on medication and plan of care. Patient verbalized understanding.    0724 Doctor latha at the bedside AROM@0724 clear fluid. SVE 3/60/-2.     0754 Anesthesiologist and Alla rn at the bedside for epidural. Time out completed.    0757 Test dose given for epidural.    0806 patient laying  down after epidural placement. [place] and EFM placed.    1035 Epidural removed

## 2025-06-03 NOTE — ANESTHESIA PREPROCEDURE EVALUATION
"                                                                                                             06/03/2025  Smita Nguyen is a 24 y.o., female.    Past Medical History:   Diagnosis Date    Anxiety     Nicotine dependence     vapes    Thyroid condition     mom not sure but has "thyroid problem", was on med but hasn't refilled , not sure of name of med       Pre-op Assessment    I have reviewed the Patient Summary Reports.     I have reviewed the Nursing Notes.    I have reviewed the Medications.     Review of Systems  Anesthesia Hx:  No problems with previous Anesthesia   Neg history of prior surgery.          Denies Family Hx of Anesthesia complications.    Denies Personal Hx of Anesthesia complications.                    Social:  Non-Smoker, No Alcohol Use       Hematology/Oncology:  Hematology Normal   Oncology Normal                                   EENT/Dental:  EENT/Dental Normal           Cardiovascular:  Cardiovascular Normal Exercise tolerance: good                                             Pulmonary:  Pulmonary Normal                       Renal/:  Renal/ Normal                 Hepatic/GI:  Hepatic/GI Normal                    Musculoskeletal:  Musculoskeletal Normal                Neurological:  Neurology Normal                                      Endocrine:  Endocrine Normal            Dermatological:  Skin Normal    Psych:  Psychiatric History                  Physical Exam  General: Well nourished    Airway:  Mallampati: II   Mouth Opening: Normal  Tongue: Normal  Neck ROM: Normal ROM    Dental:  Intact    Chest/Lungs:  Clear to auscultation    Heart:  Rate: Normal  Rhythm: Regular Rhythm  Sounds: Normal      Lab Results   Component Value Date    WBC 10.61 06/03/2025    HGB 11.6 (L) 06/03/2025    HCT 34.4 (L) 06/03/2025    MCV 90 06/03/2025     06/03/2025         Chemistry        Component Value Date/Time     10/18/2024 1513    K 3.8 10/18/2024 1513     10/18/2024 " 1513    CO2 25 10/18/2024 1513    BUN 10 10/18/2024 1513    CREATININE 0.8 10/18/2024 1513    CREATININE 0.6 08/03/2012 0240     10/18/2024 1513        Component Value Date/Time    CALCIUM 9.8 10/18/2024 1513    CALCIUM 9.0 08/03/2012 0240    ALKPHOS 75 10/18/2024 1513    AST 17 10/18/2024 1513    ALT 18 10/18/2024 1513    BILITOT 0.3 10/18/2024 1513            Anesthesia Plan  Type of Anesthesia, risks & benefits discussed:    Anesthesia Type: CSE, Epidural  Intra-op Monitoring Plan: Standard ASA Monitors  Induction:  IV  Informed Consent: Informed consent signed with the Patient and all parties understand the risks and agree with anesthesia plan.  All questions answered. Patient consented to blood products? Yes  ASA Score: 2    Ready For Surgery From Anesthesia Perspective.     .       None

## 2025-06-03 NOTE — H&P (VIEW-ONLY)
[place] [place]  History & Physical  Obstetrics   Labor and Delivery Triage    SUBJECTIVE:     Patient Info:  Smita Nguyen         24 y.o.    female    2000     Chief Complaint/Reason for Admission: scheduled induction of labor    History of Present Illness:  Patient presents at 39w0d with Estimated Date of Delivery: 6/10/25 for induction of labor.  She denies contractions or vaginal bleeding.  Denies leakage of fluid.  Fetal Movement: normal.     OB History:   OB History          2    Para   1    Term   1            AB        Living   1         SAB        IAB        Ectopic        Multiple        Live Births   1                   Estimated Date of Delivery: 6/10/25     Gestational Age:  39w0d     Past Medical History:  Past Medical History:   Diagnosis Date    Anxiety     Nicotine dependence     vapes    Thyroid condition     mom not sure but has "thyroid problem", was on med but hasn't refilled , not sure of name of med        Past Surgical History:  Past Surgical History:   Procedure Laterality Date    APPENDECTOMY      REPAIR OF LACERATION      URETEROSCOPIC REMOVAL OF URETERIC CALCULUS Right 2023    Procedure: Cystoscopy, retrograde pyelogram, ureteroscopy with laser lithotripsy, placement of ureteral stent;  Surgeon: Ly Floyd MD;  Location: [facility];  Service: Urology;  Laterality: Right;  RN PREOP 2023----LATEX ALLERGY----UPT       Social History:   Alcohol/Tobacco:  Social History     Tobacco Use    Smoking status: Former     Types: Vaping with nicotine    Smokeless tobacco: Never   Substance Use Topics    Alcohol use: No      Drug Use:  Social History     Substance and Sexual Activity   Drug Use No       Family History:  Family History   Problem Relation Name Age of Onset    Diabetes Paternal Grandmother      Heart disease Mother      Asthma Brother         Allergies:  Review of patient's allergies indicates:   Allergen Reactions    Latex Itching       Meds Prior " to Arrival:  Prior to Admission medications    Medication Sig Start Date End Date Taking? Authorizing Provider   cephALEXin (KEFLEX) 500 MG capsule Take 1 capsule (500 mg total) by mouth every 12 (twelve) hours. 8/11/23   Ly Floyd MD   famotidine (PEPCID) 20 MG tablet Take 1 tablet (20 mg total) by mouth 2 (two) times daily. 5/27/25 5/27/26  Sadaf Story MD   HYDROcodone-acetaminophen (NORCO)  mg per tablet Take 1 tablet by mouth every 6 (six) hours as needed for Pain.  Patient not taking: Reported on 4/17/2025 8/5/23   Ron Bacon Jr., MD   HYDROcodone-acetaminophen (NORCO) 5-325 mg per tablet Take 1 tablet by mouth every 6 (six) hours as needed for Pain.  Patient not taking: Reported on 4/17/2025 8/11/23   Ly Floyd MD   phenazopyridine (PYRIDIUM) 100 MG tablet Take 2 tablets (200 mg total) by mouth 3 (three) times daily with meals. 8/11/23   Ly Floyd MD   PNV,calcium 72/iron/folic acid (PRENATAL VITAMIN) Tab Take 1 tablet by mouth once daily. 12/6/24 12/6/25  Sadaf Story MD   promethazine (PHENERGAN) 25 MG tablet Take 1 tablet (25 mg total) by mouth every 4 (four) hours.  Patient not taking: Reported on 5/27/2025 12/6/24   Sadaf Story MD        Review of Systems:  Constitutional: no fever or chills  Eyes: no visual changes  ENT: no nasal congestion or sore throat  Respiratory: no cough or shortness of breath  Cardiovascular: no chest pain or palpitations  Gastrointestinal: no nausea or vomiting, tolerating diet  Genitourinary: no hematuria or dysuria  Integument/Breast: no rash or pruritis  Hematologic/Lymphatic: no easy bruising or lymphadenopathy  Musculoskeletal: no arthralgias or myalgias  Neurological: no seizures or tremors  Behavioral/Psych: no auditory or visual hallucinations  Endocrine: no heat or cold intolerance    OBJECTIVE:     Recent Vitals:  LMP 09/01/2024 (Exact Date)     Exam:    General: well developed, well nourished  Lungs:   clear to auscultation bilaterally  Heart: regular rate and rhythm  Abdomen: Gravid and nontender  Pelvic:  Cervix: 2/50/-3  Extremities: no cyanosis or edema, or clubbing    Lab Results:  No results found for this or any previous visit (from the past 36 hours).  Lab Results   Component Value Date    GROUPTR A POS 10/18/2024                ASSESSMENT/PLAN:     Dx:24 y.o.  at 39w0d here for induction of labor.     Admit to MD: Sadaf Story MD    Admit to: admitted to the hospital    induction of labor    - Admit to labor and delivery for induction of labor.  - Will start induction of labor with Pitocin.  - Continuous tocometer and external fetal heart rate monitor.  - CBC and type and screen.  - Consents signed and on the chart.  - Consult anesthesia prn for epidural.       Sadaf Story MD  2025 7:58 PM

## 2025-06-03 NOTE — DISCHARGE INSTRUCTIONS
After a Vaginal Birth    General Discharge Instructions    May follow a regular diet, unless otherwise discussed with physician.  Take showers, not baths unless otherwise discussed with physician.  Activity as tolerated.  No lifting or heavy exercise for 6 weeks, no driving for 2 weeks, no sexual intercourse, douching or tampons for 6 weeks  May return to work/school as discussed with physician  Take medications as directed  Discuss birth control with physician  Breast care support bra worn at all times  Lactation consultant referral number ( [phone] or [phone])    Call Your Healthcare Provider Right Away If You Have:  A temperature of 100.4°F or higher.  If your blood pressure is over 140/90.  You have difficulty catching your breath or trouble breathing.  Heavy vaginal bleeding, clots, or vaginal discharge with foul odor. (heavier than menses)  Persistent nausea or vomiting.  You gain more than 3 pounds in 3 days.  Severe headaches not relieved by Tylenol (acetaminophen) or Motrin (ibuprofen)  Blurry or double vision, see spots or flashing lights.  Dizziness or fainting.  New onset swelling or worsening of existing swelling.  Burning or pain when you urinate.  No bowel movement for 5 days.  Redness, warmth, swelling, or pain in the lower leg.  Redness, discharge, or pain worse than you had in the hospital.  Burning, pain, red streaks, or lumpy areas in your breasts.  Cracks, blisters, or blood on your nipples.  Feelings of extreme sadness or anxiety, or a feeling that you dont want to be with your baby.  If you have any new or unusual symptoms or have questions or concerns    Some of these symptoms can occur up to 4 to 6 weeks after delivery. This can be a sign of pre-eclampsia, which is a serious disease that can cause stroke, seizures, organ damage, or death. Do not wait to call your doctor or seek medical attention.    If You Had Stitches  You may have received stitches in the skin near your vagina.  The stitches might have closed an episiotomy (an incision that enlarges the opening of the vagina). Or you may have needed stitches to repair torn skin. Either way, your stitches should dissolve within weeks. Until then, you can help reduce discomfort, aid healing, and reduce your risk of infection by keeping the stitches clean. These tips can help:    Gently wipe from front to back after you urinate or have a bowel movement.  After wiping, spray warm water on the area. Or you can have a sitz bath. This means sitting in a tub with a few inches of water in it. Then pat the area dry or use a hairdryer on a cool setting.  You can take a shower instead of a bath.  Change sanitary pads at least every 2-4 hours.  Place cold or heat packs on the area as directed by your doctors or nurses. Keep a thin towel between the pack and your skin.  Sit on firm seats so the stitches pull less.     Follow-Up  Schedule a  follow-up exam with your healthcare provider for about 6 weeks after delivery. During this exam, your uterus and vaginal area will be checked. Contact your healthcare provider if you think you or your baby are having any problems.                        Breastfeeding Discharge Instructions    AAP recommends exclusive breastfeeding for the first 6 months of life and continued breastfeeding with the introduction of supplemental foods beyond the first year of life.  Recommends to delay all bottle and pacifier use until after 4 weeks of age and breastfeeding is well established.  Discussed the benefits of exclusive breastfeeding for both mother and baby.  Discussed the risks of supplementation/pacifier use on the exclusivity of breastfeeding in the first 6 months. Feed the baby at the earliest sign of hunger or comfort  Hands to mouth, sucking motions  Rooting or searching for something to suck on  Dont wait for crying - it is a not a late sign of hunger; it is a sign of distress    The feedings may be  8-12 times per 24hrs and will not follow a schedule  Alternate the breast you start the feeding with, or start with the breast that feels the fullest  Switch breasts when the baby takes himself off the breast or falls asleep  Keep offering breasts until the baby looks full, no longer gives hunger signs, and stays asleep when placed on his back in the crib  If the baby is sleepy and wont wake for a feeding, put the baby skin-to-skin dressed in a diaper against the mothers bare chest  Sleep near your baby  The baby should be positioned and latched on to the breast correctly  Chest-to-chest, chin in the breast  Babys lips are flipped outward  Babys mouth is stretched open wide like a shout  Babys sucking should feel like tugging to the mother  The baby should be drinking at the breast:  You should hear swallowing or gulping throughout the feeding  You should see milk on the babys lips when he comes off the breast  Your breasts should be softer when the baby is finished feeding  The baby should look relaxed at the end of feedings  After the 4th day and your milk is in:  The babys poop should turn bright yellow and be loose, watery, and seedy  The baby should have at least 3-4 poops the size of the palm of your hand per day  The baby should have at least 6-8 wet diapers per day  The urine should be light yellow in color  You should drink when you are thirsty and eat a healthy diet when you are    hungry.     Take naps to get the rest you need.   Take medications and/or drink alcohol only with permission of your obstetrician    or the babys pediatrician.  You can also call the [facility],   ([phone]), Monday-Friday, 8am-5pm Central time, to get the most   up-to-date evidence-based information on the use of medications during   pregnancy and breastfeeding.      The baby should be examined by a pediatrician at 3-5 days of age; unless ordered sooner by the pediatrician.  Once your milk comes in, the  baby should be back to birth weight no later than 10-14 days of age.    For questions or concerns, Please contact Lactation Services at [phone]    Instructed on primary engorgement and precautions.  Discussed:    Typical timing of the onset of engorgement  Signs and symptoms of engorgement  If the milk is flowing, use wet or dry heat applied to the breasts for approximately 10min prior to each feeding as a comfort measure to facilitate the milk ejection reflex    Follow heat treatment with breast massage to soften hard/lumpy areas of the breast    Use unrestricted, frequent, effective feedings    Wake baby to feed if necessary    Avoid pacifier and bottle feedings    Hand express or pump breasts to the point of comfort as needed    Use cold treatments in the form of ice packs/gel packs/ frozen vegetables wrapped in a soft thin cloth and applied to the breasts for approximately 20min after each feeding until engorgement is resolved    Wear comfortable, supportive bra    Take pain medicine as needed    Use anti-inflammatory medications if prescribed by physician    Preparation and Hygiene:  Shower daily.  Wear a clean bra each day and wash daily in warm soapy water.  Change wet or moist breast pads frequently.  Moist pads can promote growth of germs.  Actively wash your hands, paying close attention to the area around and under your fingernails, thoroughly with soap and water for 15 seconds before pumping or handling your milk.  Re-wash your hands if you touch anything (scratching your nose, answering the phone, etc) while pumping or handling your milk.   Before pumping your breasts, assemble the pump collection kit and have ready the sterile container and labels.  Place these items on a clean surface next to the breast pump.  Each time after you have finished pumping, take apart all of the parts of the breast pump collection kit and place them in a separate cleaning container (do not place them in the sink).  Be  sure to remove the yellow valve from the breast shield and separate the white membrane from the yellow valve.  Rinse all of these parts with cool water.  Then use a new sponge and/or bottle brush and dishwashing detergent to clean the parts.  Rinse off the soapy water with cool water and air dry on a clean towel covered with a clean cloth.  All parts may also be washed after each use in the top rack of a .  Once each day, sanitize all of the parts of the breast pump collection kit.  This can be done by boiling the kit parts for 10 minutes or by using a Quick Clean Micro-Steam Bag made by [employer]  If condensation appears in the tubing, continue to run the pump with the tubing attached for 1-2 minutes or until the tubing is dry.   Notify your babys nurse or doctor if you become ill or need to take any medication, even over-the-counter medicines.  Collection and Storage of Expressed Breast milk:         1. Pump your breasts at least 8 or more times every 24 hours.  Double pump (both breasts at the same time) for at least 15-20 minutes using the most suction that is comfortable.    2. Write the date and time of pumping and the name of any medications you are taking on the babys pre-printed hospital identification label.   3.    Do not touch the inside of the storage containers or lids.  4.        Tightly screw the lid onto the container and place immediately into the refrigerator for daily use.  5.    Expressed breast milk should be refrigerated or frozen within 4 hours of pumping.  6.        Do not store expressed breast milk on the door of your refrigerator or freezer             where the temperature is warmer.   7.        Refrigerated milk may be stored for up to 7 days.  At this point it can be moved to the freezer for 6 -12 months.  8.        Thaw frozen breast milk in overnight in the refrigerator.  Once milk is thawed it must be used within 24 hours.  9.        Refrigerated breast milk needs to  be warmed to room temperature.  Warm by leaving unrefrigerated until it reached room temperature, or place sealed bottle into a cup of warm (not boiling) water or use a bottle warmer.               Never warm breast milk in a microwave or boiling water.    For any questions or concerns call the Lactation Department at [phone]

## 2025-06-03 NOTE — PLAN OF CARE
Problem: Labor  Goal: Hemostasis  6/3/2025 0959 by Consuelo Wright RN  Outcome: Met  6/3/2025 0823 by Consuelo Wright RN  Outcome: Progressing  Goal: Stable Fetal Wellbeing  6/3/2025 0959 by Consuelo Wright RN  Outcome: Met  6/3/2025 0823 by Consuelo Wright RN  Outcome: Progressing  Goal: Effective Progression to Delivery  6/3/2025 0959 by Consuelo Wright RN  Outcome: Met  6/3/2025 0823 by Consuelo Wright RN  Outcome: Progressing  Goal: Absence of Infection Signs and Symptoms  6/3/2025 0959 by Consuelo Wright RN  Outcome: Met  6/3/2025 0823 by Consuelo Wright RN  Outcome: Progressing  Goal: Acceptable Pain Control  6/3/2025 0959 by Consuelo Wright RN  Outcome: Met  6/3/2025 0823 by Consuelo Wright RN  Outcome: Progressing  Goal: Normal Uterine Contraction Pattern  6/3/2025 0959 by Consuelo Wright RN  Outcome: Met  6/3/2025 0823 by Consuelo Wright RN  Outcome: Progressing

## 2025-06-03 NOTE — LACTATION NOTE
"   06/03/25 1010   Maternal Assessment   Breast Density Bilateral:;soft   Areola Bilateral:;elastic   Nipples Bilateral:;everted   Maternal Infant Feeding   Maternal Emotional State assist needed   Infant Positioning clutch/football   Signs of Milk Transfer audible swallow;infant jaw motion present   Pain with Feeding no   Latch Assistance yes     Mother with baby skin to skin and at the breast in clutch football hold -baby latching on and off -assistance to achieve deeper latch with strong sucking and swallow noted -mother denies discomfort with feeding -review basic breastfeeding information -states did not "really try" with first baby but wants this baby to breastfeed -encouraged call for any assistance today   "

## 2025-06-03 NOTE — NURSING
Pt arrived to unit ambulatory. For scheduled induction @ 39 wga.  Instructed to shower and call RN when completed. Allergies noted.

## 2025-06-03 NOTE — PROGRESS NOTES
[facility] - [place] - Labor & Delivery  Obstetrics  Labor Progress Note    Patient Name: Smita Nguyen  MRN: [id number]  Admission Date: 2025  Hospital Length of Stay: 1 days  Attending Physician: Sadaf Story MD  Primary Care Provider: Vianney Salgado FNP    Subjective:     Principal Problem:<principal problem not specified>    Interval History:  Smita is a 24 y.o.  at 39w0d. She is doing well. Feeling contractions    Objective:     Vital Signs (Most Recent):  Temp: 98 °F (36.7 °C) (25)  Pulse: (!) 59 (25)  Resp: 18 (25)  BP: 123/81 (25)  SpO2: 96 % (25) Vital Signs (24h Range):  Temp:  [98 °F (36.7 °C)-98.7 °F (37.1 °C)] 98 °F (36.7 °C)  Pulse:  [0-87] 59  Resp:  [16-18] 18  SpO2:  [96 %-99 %] 96 %  BP: ()/(53-87) 123/81     Weight: 86.3 kg (190 lb 4.1 oz)  Body mass index is 34.8 kg/m².    FHT: 140 Cat 1 (reassuring)  TOCO:  Q 2 minutes    Physical Exam    Cervical Exam:  Dilation:  3  Effacement:  50%  Station: -2  Presentation: Vertex     Significant Labs:  Lab Results   Component Value Date    GROUPTRH A POS 2025    HEPBSAG Non-reactive 2025       I have personallly reviewed all pertinent lab results from the last 24 hours.    Assessment/Plan:     24 y.o. female  at 39w0d for induction of labor  AROM with clear fluid.    - anticipate     Sadaf Story MD  Obstetrics  [facility] - [place] - Labor & Delivery

## 2025-06-03 NOTE — PLAN OF CARE
Problem: Adult Inpatient Plan of Care  Goal: Plan of Care Review  Outcome: Ongoing  Goal: Patient-Specific Goal (Individualized)  Outcome: Ongoing  Goal: Absence of Hospital-Acquired Illness or Injury  Outcome: Ongoing  Goal: Optimal Comfort and Wellbeing  Outcome: Ongoing  Goal: Readiness for Transition of Care  Outcome: Ongoing     Problem:  Fall Injury Risk  Goal: Absence of Fall, Infant Drop and Related Injury  Outcome: Ongoing     Problem: Infection  Goal: Absence of Infection Signs and Symptoms  Outcome: Ongoing     Problem: Labor  Goal: Hemostasis  Outcome: Ongoing  Goal: Stable Fetal Wellbeing  Outcome: Ongoing  Goal: Effective Progression to Delivery  Outcome: Ongoing  Goal: Absence of Infection Signs and Symptoms  Outcome: Ongoing  Goal: Acceptable Pain Control  Outcome: Ongoing  Goal: Normal Uterine Contraction Pattern  Outcome: Ongoing

## 2025-06-03 NOTE — PLAN OF CARE
Problem: Adult Inpatient Plan of Care  Goal: Plan of Care Review  Outcome: Progressing  Goal: Patient-Specific Goal (Individualized)  Outcome: Progressing  Goal: Absence of Hospital-Acquired Illness or Injury  Outcome: Progressing  Goal: Optimal Comfort and Wellbeing  Outcome: Progressing     Problem:  Fall Injury Risk  Goal: Absence of Fall, Infant Drop and Related Injury  Outcome: Progressing     Problem: Infection  Goal: Absence of Infection Signs and Symptoms  Outcome: Progressing     Problem: Postpartum (Vaginal Delivery)  Goal: Successful Parent Role Transition  Outcome: Progressing  Goal: Hemostasis  Outcome: Progressing  Goal: Absence of Infection Signs and Symptoms  Outcome: Progressing  Goal: Anesthesia/Sedation Recovery  Outcome: Met  Goal: Optimal Pain Control and Function  Outcome: Progressing  Goal: Effective Urinary Elimination  Outcome: Progressing     Problem: Breastfeeding  Goal: Effective Breastfeeding  Outcome: Progressing

## 2025-06-03 NOTE — L&D DELIVERY NOTE
[facility] - Labor & Delivery  Vaginal Delivery   Operative Note    SUMMARY     Normal spontaneous vaginal delivery of live infant, was placed on mothers abdomen for skin to skin and bulb suctioning performed.  Infant delivered position OA over intact perineum.  Nuchal cord: Yes, cord reduced at perineum.    Spontaneous delivery of placenta and IV pitocin given noting good uterine tone.  No lacerations noted.  Patient tolerated delivery well. Sponge needle and lap counted correctly x2.    Indications:  (spontaneous vaginal delivery)  Pregnancy complicated by: Problem List[1]  Admitting GA: 39w0d    Delivery Information for Marlon Nguyen    Birth information:  YOB: 2027   Time of birth: 9:42 AM   Sex: female   Head Delivery Date/Time: 6/3/2025  9:42 AM   Delivery type: Vaginal, Spontaneous   Gestational Age: 39w0d       Delivery Providers    Delivering clinician: Sadaf Story MD   Provider Role    Lucy Vivar RN Transition Nursery    Consuelo Wright RN Delivery Nurse    Tanvi Flores RN Charge Nurse              Measurements    Weight: 2730 g  Weight (lbs): 6 lb 0.3 oz  Length:          Apgars    Living status: Living  Apgar Component Scores:  1 min.:  5 min.:  10 min.:  15 min.:  20 min.:    Skin color:  0  0       Heart rate:  2  2       Reflex irritability:  2  2       Muscle tone:  2  2       Respiratory effort:  2  2       Total:  8  8       Apgars assigned by: SUDHIR VIVAR RN         Operative Delivery    Forceps attempted?: No  Vacuum extractor attempted?: No         Shoulder Dystocia    Shoulder dystocia present?: No           Presentation    Presentation: Compound  Position: Middle Occiput Anterior           Interventions/Resuscitation    Method: Bulb Suctioning, Tactile Stimulation       Cord    Vessels: 3 vessels  Complications: Nuchal  Nuchal Intervention: reduced  Nuchal Cord Description: loose nuchal cord  Number of Loops: 1  Delayed Cord Clamping?:  Yes  Cord Clamped Date/Time: 6/3/2025  9:43 AM  Cord Blood Disposition: Sent with Baby  Gases Sent?: No  Stem Cell Collection (by MD): No       Placenta    Placenta delivery date/time: 6/3/2025 09:45  Placenta removal: Spontaneous  Placenta appearance: Intact  Placenta disposition: Donation           Labor Events:       labor: No     Labor Onset Date/Time: 2025 00:54     Dilation Complete Date/Time: 2025 09:41     Start Pushing Date/Time: 2025 09:41       Start Pushing Date/Time: 2025 09:41     Rupture Date/Time: 25 0724        Rupture type: ARM (Artificial Rupture)        Fluid Amount:       Fluid Color: Clear              steroids: None     Antibiotics given for GBS: No     Induction: oxytocin;amniotomy     Indications for induction:  Elective     Augmentation:       Indications for augmentation:       Labor complications: None     Additional complications:          Cervical ripening:                     Delivery:      Episiotomy: None     Indication for Episiotomy:       Perineal Lacerations: None Repaired:      Periurethral Laceration:   Repaired:     Labial Laceration:   Repaired:     Sulcus Laceration:   Repaired:     Vaginal Laceration:   Repaired:     Cervical Laceration:   Repaired:     Repair suture: None     Repair # of packets: 0     Last Value - EBL - Nursing (mL):       Sum - EBL - Nursing (mL): 0     Last Value - EBL - Anesthesia (mL):      Calculated QBL (mL): 250     Running total QBL (mL): 250     Vaginal Sweep Performed: Yes     Surgicount Correct: Yes     Vaginal Packing: No Quantity:       Other providers:       Anesthesia    Method: Epidural          Details (if applicable):  Trial of Labor      Categorization:      Priority:     Indications for :     Incision Type:       Additional  information:  Forceps:    Vacuum:    Breech:    Observed anomalies    Other (Comments):              [1]   Patient Active Problem  List  Diagnosis    Right ureteral calculus     (spontaneous vaginal delivery)

## 2025-06-04 LAB
ABSOLUTE EOSINOPHIL (OHS): 0.15 K/UL
ABSOLUTE MONOCYTE (OHS): 0.77 K/UL (ref 0.3–1)
ABSOLUTE NEUTROPHIL COUNT (OHS): 6.47 K/UL (ref 1.8–7.7)
BASOPHILS # BLD AUTO: 0.03 K/UL
BASOPHILS NFR BLD AUTO: 0.3 %
ERYTHROCYTE [DISTWIDTH] IN BLOOD BY AUTOMATED COUNT: 13.2 % (ref 11.5–14.5)
HCT VFR BLD AUTO: 33 % (ref 37–48.5)
HGB BLD-MCNC: 10.6 GM/DL (ref 12–16)
IMM GRANULOCYTES # BLD AUTO: 0.05 K/UL (ref 0–0.04)
IMM GRANULOCYTES NFR BLD AUTO: 0.5 % (ref 0–0.5)
LYMPHOCYTES # BLD AUTO: 2.57 K/UL (ref 1–4.8)
MCH RBC QN AUTO: 29.8 PG (ref 27–31)
MCHC RBC AUTO-ENTMCNC: 32.1 G/DL (ref 32–36)
MCV RBC AUTO: 93 FL (ref 82–98)
NUCLEATED RBC (/100WBC) (OHS): 0 /100 WBC
PLATELET # BLD AUTO: 224 K/UL (ref 150–450)
PMV BLD AUTO: 10.3 FL (ref 9.2–12.9)
RBC # BLD AUTO: 3.56 M/UL (ref 4–5.4)
RELATIVE EOSINOPHIL (OHS): 1.5 %
RELATIVE LYMPHOCYTE (OHS): 25.6 % (ref 18–48)
RELATIVE MONOCYTE (OHS): 7.7 % (ref 4–15)
RELATIVE NEUTROPHIL (OHS): 64.4 % (ref 38–73)
WBC # BLD AUTO: 10.04 K/UL (ref 3.9–12.7)

## 2025-06-04 PROCEDURE — 11000001 HC ACUTE MED/SURG PRIVATE ROOM

## 2025-06-04 PROCEDURE — 85025 COMPLETE CBC W/AUTO DIFF WBC: CPT | Performed by: OBSTETRICS & GYNECOLOGY

## 2025-06-04 PROCEDURE — 36415 COLL VENOUS BLD VENIPUNCTURE: CPT | Performed by: OBSTETRICS & GYNECOLOGY

## 2025-06-04 PROCEDURE — 25000003 PHARM REV CODE 250: Performed by: OBSTETRICS & GYNECOLOGY

## 2025-06-04 PROCEDURE — 99231 SBSQ HOSP IP/OBS SF/LOW 25: CPT | Mod: ,,, | Performed by: OBSTETRICS & GYNECOLOGY

## 2025-06-04 RX ORDER — DOCUSATE SODIUM 100 MG/1
100 CAPSULE, LIQUID FILLED ORAL 2 TIMES DAILY
Qty: 60 CAPSULE | Refills: 1 | Status: SHIPPED | OUTPATIENT
Start: 2025-06-04 | End: 2026-06-04

## 2025-06-04 RX ORDER — ACETAMINOPHEN 500 MG
1000 TABLET ORAL EVERY 6 HOURS PRN
Qty: 100 TABLET | Refills: 2 | Status: SHIPPED | OUTPATIENT
Start: 2025-06-04 | End: 2026-06-04

## 2025-06-04 RX ORDER — IBUPROFEN 800 MG/1
800 TABLET, FILM COATED ORAL EVERY 8 HOURS PRN
Qty: 60 TABLET | Refills: 2 | Status: SHIPPED | OUTPATIENT
Start: 2025-06-04 | End: 2026-06-04

## 2025-06-04 RX ADMIN — PRENATAL VITAMINS-IRON FUMARATE 27 MG IRON-FOLIC ACID 0.8 MG TABLET 1 TABLET: at 08:06

## 2025-06-04 RX ADMIN — IBUPROFEN 600 MG: 600 TABLET ORAL at 05:06

## 2025-06-04 NOTE — PLAN OF CARE
Problem: Adult Inpatient Plan of Care  Goal: Plan of Care Review  Outcome: Progressing  Goal: Patient-Specific Goal (Individualized)  Outcome: Progressing  Goal: Absence of Hospital-Acquired Illness or Injury  Outcome: Progressing  Goal: Optimal Comfort and Wellbeing  Outcome: Progressing     Problem:  Fall Injury Risk  Goal: Absence of Fall, Infant Drop and Related Injury  Outcome: Progressing     Problem: Infection  Goal: Absence of Infection Signs and Symptoms  Outcome: Progressing     Problem: Postpartum (Vaginal Delivery)  Goal: Successful Parent Role Transition  Outcome: Progressing  Goal: Hemostasis  Outcome: Met  Goal: Absence of Infection Signs and Symptoms  Outcome: Progressing  Goal: Optimal Pain Control and Function  Outcome: Met  Goal: Effective Urinary Elimination  Outcome: Met     Problem: Breastfeeding  Goal: Effective Breastfeeding  Outcome: Progressing

## 2025-06-04 NOTE — LACTATION NOTE
This note was copied from a baby's chart.  Instructed on normal  feeding and sleeping patterns.  Encouraged mother to feed the infant on cue, a minimum of 8 times in 24 hours prior to supplementation to promote appropriate breast stimulation for adequate milk supply.  Discussed preferred alternative feeding methods, such as supplementing the infant via breast with SNS, syringe feeding, cup feeding, spoon feeding and finger feeding.  Discussed risks and encouraged to avoid artificial bottles and nipples.  Chooses to supplement via syringe.  Safely taught how to feed infant via chosen method.  Demonstrated by nurse and pt return demonstrates proper and safe usage.  States understand and provided appropriate recall of all information.    Donor milk discussed and offered.  Mom declined using donor milk.

## 2025-06-04 NOTE — PLAN OF CARE
Problem: Adult Inpatient Plan of Care  Goal: Optimal Comfort and Wellbeing  Outcome: Progressing     Problem: Infection  Goal: Absence of Infection Signs and Symptoms  Outcome: Progressing     Problem: Postpartum (Vaginal Delivery)  Goal: Successful Parent Role Transition  Outcome: Progressing  Goal: Hemostasis  Outcome: Progressing  Goal: Absence of Infection Signs and Symptoms  Outcome: Progressing  Goal: Optimal Pain Control and Function  Outcome: Progressing  Goal: Effective Urinary Elimination  Outcome: Progressing

## 2025-06-04 NOTE — PROGRESS NOTES
[facility] [place]  Obstetrics  Postpartum Progress Note    Patient Name: Smita Nguyen  MRN: [id number]  Admission Date: 2025  Hospital Length of Stay: 2 days  Attending Physician: Sadaf Story MD  Primary Care Provider: Vianney Salgado FNP    Subjective:     Principal Problem: (spontaneous vaginal delivery)    Hospital Course:  6/3/2025  performed.  No lacerations.    2025 Postpartum  day # 1.  Doing well. Breast and bottle Feeding.  Pain well controlled. Normal lochia.  Ambulating and voiding without difficulty.  Tolerating a regular diet without nausea or vomiting.       She is doing well this morning. She is tolerating a regular diet without nausea or vomiting. She is voiding spontaneously. She is ambulating. She has passed flatus, and has not a BM. Vaginal bleeding is mild. She denies fever or chills. Abdominal pain is mild and controlled with oral medications. She Is breastfeeding. She desires circumcision for her male baby: not applicable.    Objective:     Vital Signs (Most Recent):  Temp: 97.4 °F (36.3 °C) (25)  Pulse: 71 (25)  Resp: 20 (25)  BP: 112/64 (25)  SpO2: 97 % (25) Vital Signs (24h Range):  Temp:  [97.4 °F (36.3 °C)-98.5 °F (36.9 °C)] 97.4 °F (36.3 °C)  Pulse:  [60-98] 71  Resp:  [18-20] 20  SpO2:  [95 %-99 %] 97 %  BP: (100-163)/(60-89) 112/64     Weight: 86.3 kg (190 lb 4.1 oz)  Body mass index is 34.8 kg/m².      Intake/Output Summary (Last 24 hours) at 2025 0846  Last data filed at 2025 0400  Gross per 24 hour   Intake 3595.46 ml   Output 2150 ml   Net 1445.46 ml         Significant Labs:  Lab Results   Component Value Date    GROUPTRH A POS 2025    HEPBSAG Non-reactive 2025     Recent Labs   Lab 25  0547   HGB 10.6*   HCT 33.0*       I have personallly reviewed all pertinent lab results from the last 24 hours.    Physical Exam:   Constitutional: She is oriented to person, place, and time.  She appears well-developed. No distress.    HENT:   Head: Normocephalic and atraumatic.    Eyes: EOM are normal.     Cardiovascular:  Normal rate.             Pulmonary/Chest: Effort normal.        Abdominal: Soft. She exhibits no distension and no mass (fundus firm and below the umbilicus). There is no abdominal tenderness.             Musculoskeletal: Normal range of motion.       Neurological: She is oriented to person, place, and time.    Skin: Skin is warm. No rash noted.    Psychiatric: She has a normal mood and affect.       Review of Systems  Assessment/Plan:     24 y.o. female  for:    *  (spontaneous vaginal delivery)  Routine postpartum care        Disposition: As patient meets milestones, will plan to discharge tomorrow.    Sadaf Story MD  Obstetrics  [facility] [place] - [facility]

## 2025-06-04 NOTE — LACTATION NOTE
06/04/25 0940   Maternal Assessment   Breast Density Bilateral:;soft   Areola Bilateral:;elastic   Nipples Bilateral:;everted   Maternal Infant Feeding   Maternal Emotional State independent   Infant Positioning clutch/football   Signs of Milk Transfer audible swallow;infant jaw motion present   Pain with Feeding no   Latch Assistance yes     Infant breastfeeding on right breast in football position. Mother states infant struggled last night-had fluid and was spitting up. States infant seems to be latching better now. Instructed to reposition infant closer to breast to help support deeper more comfortable latch. Infant able to obtain and sustain effective latch. Started falling sleep after approximately 10 minutes. Demonstrated how to stimulate infant to more alert state to help prolong feed. Mother complied, infant more alert-rooting-mother switched to right breast. Infant continues to nurse. Some basic breastfeeding information reviewed. Lanolin and instructions on use provided. Patient verbalized understanding. All questions answered. Told to call for further lactation support needs.

## 2025-06-04 NOTE — SUBJECTIVE & OBJECTIVE
She is doing well this morning. She is tolerating a regular diet without nausea or vomiting. She is voiding spontaneously. She is ambulating. She has passed flatus, and has not a BM. Vaginal bleeding is mild. She denies fever or chills. Abdominal pain is mild and controlled with oral medications. She Is breastfeeding. She desires circumcision for her male baby: not applicable.    Objective:     Vital Signs (Most Recent):  Temp: 97.4 °F (36.3 °C) (06/04/25 0744)  Pulse: 71 (06/04/25 0744)  Resp: 20 (06/04/25 0744)  BP: 112/64 (06/04/25 0744)  SpO2: 97 % (06/04/25 0744) Vital Signs (24h Range):  Temp:  [97.4 °F (36.3 °C)-98.5 °F (36.9 °C)] 97.4 °F (36.3 °C)  Pulse:  [60-98] 71  Resp:  [18-20] 20  SpO2:  [95 %-99 %] 97 %  BP: (100-163)/(60-89) 112/64     Weight: 86.3 kg (190 lb 4.1 oz)  Body mass index is 34.8 kg/m².      Intake/Output Summary (Last 24 hours) at 6/4/2025 0846  Last data filed at 6/4/2025 0400  Gross per 24 hour   Intake 3595.46 ml   Output 2150 ml   Net 1445.46 ml         Significant Labs:  Lab Results   Component Value Date    GROUPTRH A POS 06/03/2025    HEPBSAG Non-reactive 03/14/2025     Recent Labs   Lab 06/04/25  0547   HGB 10.6*   HCT 33.0*       I have personallly reviewed all pertinent lab results from the last 24 hours.    Physical Exam:   Constitutional: She is oriented to person, place, and time. She appears well-developed. No distress.    HENT:   Head: Normocephalic and atraumatic.    Eyes: EOM are normal.     Cardiovascular:  Normal rate.             Pulmonary/Chest: Effort normal.        Abdominal: Soft. She exhibits no distension and no mass (fundus firm and below the umbilicus). There is no abdominal tenderness.             Musculoskeletal: Normal range of motion.       Neurological: She is oriented to person, place, and time.    Skin: Skin is warm. No rash noted.    Psychiatric: She has a normal mood and affect.       Review of Systems

## 2025-06-05 VITALS
BODY MASS INDEX: 35.01 KG/M2 | SYSTOLIC BLOOD PRESSURE: 99 MMHG | RESPIRATION RATE: 20 BRPM | WEIGHT: 190.25 LBS | OXYGEN SATURATION: 97 % | HEART RATE: 83 BPM | TEMPERATURE: 98 F | HEIGHT: 62 IN | DIASTOLIC BLOOD PRESSURE: 57 MMHG

## 2025-06-05 PROCEDURE — 25000003 PHARM REV CODE 250: Performed by: OBSTETRICS & GYNECOLOGY

## 2025-06-05 PROCEDURE — 99238 HOSP IP/OBS DSCHRG MGMT 30/<: CPT | Mod: ,,, | Performed by: OBSTETRICS & GYNECOLOGY

## 2025-06-05 RX ADMIN — IBUPROFEN 600 MG: 600 TABLET ORAL at 11:06

## 2025-06-05 RX ADMIN — PRENATAL VITAMINS-IRON FUMARATE 27 MG IRON-FOLIC ACID 0.8 MG TABLET 1 TABLET: at 09:06

## 2025-06-05 RX ADMIN — IBUPROFEN 600 MG: 600 TABLET ORAL at 05:06

## 2025-06-05 NOTE — PLAN OF CARE
Pt stable.  VS WDL.  Voiding and ambulating independently.  Fundus firm, midline 1 below, light bleeding.  Denies any need for pain meds.  Tolerating regular diet.  Pumping, visits baby in NICU.  Pt doing well.

## 2025-06-05 NOTE — PLAN OF CARE
7/16/2018       Marietta Sauer   [id number] [id number]        Dear Marietta:    You have been referred to the [facility] High Risk Breast Screening Program for an assessment of your breast cancer risk and for recommendations for breast cancer screening based on the Breast Cancer Family History survey you completed at your recent mammogram.  We look forward to working with you.    Many options are now available to help determine and modify an individual's breast cancer risk. Our team's approach is to provide high-quality, evidenced based care to at-risk women. This knowledge will enable you to make informed decisions about breast cancer screening and prevention.      The process starts with an initial phone call from one of our breast nurse navigators.  If you are identified as higher risk, we will help you schedule an appointment at the High Risk Breast Clinic. Here you will be comprehensively evaluated in a multidisciplinary setting by an Advanced Practice Nurse or Physician Assistant, a Medical Oncologist, Genetic Counselor and Surgical Oncologist as needed. After the initial discussion and evaluation, you will be provided with recommendations for breast cancer prevention, follow up and a surveillance plan which can be shared with your primary care provider.    To learn more or to schedule a phone screening appointment with our Nurse Navigators, please call the [facility] location at [phone].    Sincerely,        Harris Urbina MD  Medical Director  Oncology and Breast Program  [address] [place]  [address]   NICU assessment attempted.  Patient is out of the room.  SW will f/u.

## 2025-06-05 NOTE — PLAN OF CARE
Problem: Adult Inpatient Plan of Care  Goal: Plan of Care Review  Outcome: Met  Goal: Patient-Specific Goal (Individualized)  Outcome: Met  Goal: Absence of Hospital-Acquired Illness or Injury  Outcome: Met  Goal: Optimal Comfort and Wellbeing  Outcome: Met  Goal: Readiness for Transition of Care  Outcome: Met     Problem:  Fall Injury Risk  Goal: Absence of Fall, Infant Drop and Related Injury  Outcome: Met     Problem: Infection  Goal: Absence of Infection Signs and Symptoms  Outcome: Met     Problem: Postpartum (Vaginal Delivery)  Goal: Successful Parent Role Transition  Outcome: Met  Goal: Absence of Infection Signs and Symptoms  Outcome: Met   Patient discharged per order. VS stable with no signs of distress. Discharge instructions reviewed with patient. Reviewed urgent maternal warning signs and instructed to go to ER or call physician if symptoms occur. Reviewed signs and symptoms of depression and anxiety and provided relevant handouts. Reviewed community resources available. Instructed on follow-up appointment with physician. Patient voiced understanding of all.

## 2025-06-05 NOTE — LACTATION NOTE
This note was copied from a baby's chart.     06/05/25 1040   Maternal Assessment   Breast Density Bilateral:;soft   Areola Bilateral:;elastic   Nipples Bilateral:;everted   Maternal Infant Feeding   Maternal Emotional State independent;relaxed;assist needed   Infant Positioning clutch/football   Signs of Milk Transfer audible swallow;infant jaw motion present   Latch Assistance yes     Mother in NICU with baby asleep at the breast -assistance to arouse baby for feeding -baby latches easily with strong sucking and swallows but needs stimulation to stay awake and sucking -mother deensi discomfort but sates breasts feel full and heavy -encouraged pumping after this feeding -mother to be discharged today and will plan for Loaner pump for home use

## 2025-06-05 NOTE — DISCHARGE SUMMARY
[facility] [place] [facility]  Obstetrics  Discharge Summary      Patient Name: Smita Nguyen  MRN: [id number]  Admission Date: 2025  Hospital Length of Stay: 3 days  Discharge Date and Time: 2025 8:21 AM  Attending Physician: Susan Story MD   Discharging Provider: Susan Story MD   Primary Care Provider: Vianney Salgado FNP    HPI: Patient admitted for elective induction of labor.        * No surgery found *     Hospital Course:   6/3/2025  performed.  No lacerations.    2025 Postpartum  day # 1.  Doing well. Breast and bottle Feeding.  Pain well controlled. Normal lochia.  Ambulating and voiding without difficulty.  Tolerating a regular diet without nausea or vomiting.     2025 Postpartum  day # 2.  Doing well. Breast and Breast and bottle Feeding.  Pain well controlled. Normal lochia.  Ambulating and voiding without difficulty.  Tolerating a regular diet without nausea or vomiting.  Stable for discharge home.        Consults (From admission, onward)          Status Ordering Provider     Inpatient consult to Lactation  Once        Provider:  (Not yet assigned)    Acknowledged SUSAN STORY            Final Active Diagnoses:    Diagnosis Date Noted POA    PRINCIPAL PROBLEM:   (spontaneous vaginal delivery) [O80] 2025 Not Applicable      Problems Resolved During this Admission:        Significant Diagnostic Studies: Labs: CBC   Recent Labs   Lab 25  0547   WBC 10.04   HGB 10.6*   HCT 33.0*            Feeding Method: both breast and bottle    Immunizations       Date Immunization Status Dose Route/Site Given by    25 1048 MMR Incomplete 0.5 mL Subcutaneous/     25 1048 Tdap Incomplete 0.5 mL Intramuscular/             Delivery:    Episiotomy: None   Lacerations: None   Repair suture: None   Repair # of packets: 0   Blood loss (ml):       Birth information:  YOB: 2027   Time of birth: 9:42 AM   Sex: female   Delivery type: Vaginal, Spontaneous    Gestational Age: 39w0d     Measurements    Weight: 2730 g  Weight (lbs): 6 lb 0.3 oz  Length:          Delivery Clinician: Delivery Providers    Delivering clinician: Sadaf Story MD   Provider Role    Lucy Vivar RN Transition Nursery    Consuelo Wright RN Delivery Nurse    Tanvi Flores RN Charge Nurse             Additional  information:  Forceps:    Vacuum:    Breech:    Observed anomalies      Living?:     Apgars    Living status: Living  Apgar Component Scores:  1 min.:  5 min.:  10 min.:  15 min.:  20 min.:    Skin color:  0  0       Heart rate:  2  2       Reflex irritability:  2  2       Muscle tone:  2  2       Respiratory effort:  2  2       Total:  8  8       Apgars assigned by: SUDHIR VIVAR RN         Placenta: Delivered:       appearance  Pending Diagnostic Studies:       None            Discharged Condition: good    Disposition: Home or Self Care    Follow Up:   Follow-up Information       Sadaf Story MD. Schedule an appointment as soon as possible for a visit in 6 week(s).    Specialty: Obstetrics and Gynecology  Why: For wound re-check  Contact information:  [address] [place] [address] [phone]                           Patient Instructions:      BREAST PUMP FOR HOME USE     Order Specific Question Answer Comments   Type of pump: Electric    Weight: 86.3 kg (190 lb 4.1 oz)    Length of need (1-99 months): 99      Diet Adult Regular     Lifting restrictions     No driving until:   Order Comments: No longer taking narcotics     Pelvic Rest     No dressing needed     Leave dressing on - Keep it clean, dry, and intact until clinic visit     Notify your health care provider if you experience any of the following:  temperature >100.4     Notify your health care provider if you experience any of the following:  persistent nausea and vomiting or diarrhea     Notify your health care provider if you experience any of the following:  severe uncontrolled  pain     Notify your health care provider if you experience any of the following:  redness, tenderness, or signs of infection (pain, swelling, redness, odor or green/yellow discharge around incision site)     Notify your health care provider if you experience any of the following:  severe persistent headache     Notify your health care provider if you experience any of the following:  worsening rash     Notify your health care provider if you experience any of the following:  persistent dizziness, light-headedness, or visual disturbances     Notify your health care provider if you experience any of the following:  increased confusion or weakness     Medications:  Current Discharge Medication List        START taking these medications    Details   acetaminophen (TYLENOL EXTRA STRENGTH) 500 MG tablet Take 2 tablets (1,000 mg total) by mouth every 6 (six) hours as needed for Pain.  Qty: 100 tablet, Refills: 2      docusate sodium (COLACE) 100 MG capsule Take 1 capsule (100 mg total) by mouth 2 (two) times daily.  Qty: 60 capsule, Refills: 1      ibuprofen (ADVIL,MOTRIN) 800 MG tablet Take 1 tablet (800 mg total) by mouth every 8 (eight) hours as needed for Pain.  Qty: 60 tablet, Refills: 2           CONTINUE these medications which have NOT CHANGED    Details   promethazine (PHENERGAN) 25 MG tablet Take 1 tablet (25 mg total) by mouth every 4 (four) hours.  Qty: 30 tablet, Refills: 4    Associated Diagnoses: Nausea and vomiting during pregnancy      cephALEXin (KEFLEX) 500 MG capsule Take 1 capsule (500 mg total) by mouth every 12 (twelve) hours.  Qty: 4 capsule, Refills: 0      famotidine (PEPCID) 20 MG tablet Take 1 tablet (20 mg total) by mouth 2 (two) times daily.  Qty: 60 tablet, Refills: 4    Associated Diagnoses: Gastroesophageal reflux during pregnancy in second trimester, antepartum      phenazopyridine (PYRIDIUM) 100 MG tablet Take 2 tablets (200 mg total) by mouth 3 (three) times daily with meals.  Qty: 18  tablet, Refills: 0      PNV,calcium 72/iron/folic acid (PRENATAL VITAMIN) Tab Take 1 tablet by mouth once daily.  Qty: 90 tablet, Refills: 4    Comments: May prescribe any prenatal vitamin that is covered by patient's insurance.  Associated Diagnoses: Normal pregnancy, second trimester           STOP taking these medications       HYDROcodone-acetaminophen (NORCO)  mg per tablet Comments:   Reason for Stopping:         HYDROcodone-acetaminophen (NORCO) 5-325 mg per tablet Comments:   Reason for Stopping:               Sadaf Story MD  Obstetrics  [facility] [place] [facility]

## 2025-06-07 ENCOUNTER — LACTATION ENCOUNTER (OUTPATIENT)
Dept: INTENSIVE CARE | Facility: HOSPITAL | Age: 25
End: 2025-06-07

## 2025-06-07 NOTE — LACTATION NOTE
"This note was copied from a baby's chart.     06/07/25 1630   Maternal Assessment   Breast Density Bilateral:;engorged   Areola Bilateral:;elastic   Nipples Bilateral:;everted   Maternal Infant Feeding   Maternal Emotional State relaxed;assist needed   Infant Positioning cross-cradle   Signs of Milk Transfer audible swallow;infant jaw motion present   Pain with Feeding yes   Pain Location nipple, left   Pain Description sharp   Comfort Measures Before/During Feeding maternal position adjusted;infant position adjusted;latch adjusted   Latch Assistance yes     Mother is rooming in with infant in room 230.  Attempting to latch infant but is having pain with latch.  Explained latch while adjusting mother's position, support for infant, and re-latching the infant deeply.  States, "it does not hurt now."  Encouraged mother to keep the infant at the breast as long as she can.  Silicone breast pump provided to collect EBM from the right breast that is leaking while she nurses on the left.  Encouraged mother to pump her breasts for 20 minutes following the feeding because she is engorged.  Verbalized understanding. Breast pump parts collected and sterilized utilizing a microwave steam bag.  All parts were returned to room and placed on a clean dry towel.  Explained microwave steam bag to mother and how to use it and that it can be used 20 times before discarding.  Explained that she needs to use it once every 24 hours.  Verbalized understanding.  "

## 2025-06-08 ENCOUNTER — LACTATION ENCOUNTER (OUTPATIENT)
Dept: INTENSIVE CARE | Facility: HOSPITAL | Age: 25
End: 2025-06-08

## 2025-06-08 ENCOUNTER — PATIENT MESSAGE (OUTPATIENT)
Dept: LACTATION | Facility: CLINIC | Age: 25
End: 2025-06-08
Payer: MEDICAID

## 2025-06-08 NOTE — LACTATION NOTE
This note was copied from a baby's chart.  Mother is engorged.  She is breast feeding baby at this time.  Encouraged pumping.  States she has been trying to and was able to get 8 oz with pumping.  Discussed using ice/cold packs between pumping and feeding.  Explained the use of heat and cold.  Using an ice/cold pack after breastfeeding then pumping may help to slow milk down. Reviewed signs and symptoms of mastitis and when to call her doctor.  Verbalized understanding.

## 2025-06-09 NOTE — ANESTHESIA POSTPROCEDURE EVALUATION
Anesthesia Post Evaluation    Patient: Smita Nguyen    Procedure(s) Performed: * No procedures listed *    Final Anesthesia Type: CSE      Patient location during evaluation: labor & delivery  Patient participation: Yes- Able to Participate  Level of consciousness: awake and alert, oriented and awake  Post-procedure vital signs: reviewed and stable  Airway patency: patent    PONV status at discharge: No PONV  Anesthetic complications: no      Cardiovascular status: blood pressure returned to baseline  Respiratory status: unassisted, spontaneous ventilation and room air  Hydration status: euvolemic  Follow-up not needed.                  No case tracking events are documented in the log.      Pain/Too Score: No data recorded

## 2025-06-16 ENCOUNTER — PATIENT MESSAGE (OUTPATIENT)
Dept: OBSTETRICS AND GYNECOLOGY | Facility: HOSPITAL | Age: 25
End: 2025-06-16
Payer: MEDICAID

## 2025-06-24 ENCOUNTER — PATIENT MESSAGE (OUTPATIENT)
Dept: OBSTETRICS AND GYNECOLOGY | Facility: CLINIC | Age: 25
End: 2025-06-24
Payer: MEDICAID

## 2025-08-05 ENCOUNTER — POSTPARTUM VISIT (OUTPATIENT)
Dept: OBSTETRICS AND GYNECOLOGY | Facility: CLINIC | Age: 25
End: 2025-08-05
Payer: MEDICAID

## 2025-08-05 VITALS
DIASTOLIC BLOOD PRESSURE: 64 MMHG | BODY MASS INDEX: 31.27 KG/M2 | SYSTOLIC BLOOD PRESSURE: 106 MMHG | WEIGHT: 170.94 LBS

## 2025-08-05 DIAGNOSIS — Z30.09 FAMILY PLANNING: Primary | ICD-10-CM

## 2025-08-05 PROCEDURE — 87624 HPV HI-RISK TYP POOLED RSLT: CPT

## 2025-08-05 PROCEDURE — 88175 CYTOPATH C/V AUTO FLUID REDO: CPT | Mod: TC

## 2025-08-05 PROCEDURE — 99999 PR PBB SHADOW E&M-EST. PATIENT-LVL III: CPT | Mod: PBBFAC,,,

## 2025-08-05 PROCEDURE — 99213 OFFICE O/P EST LOW 20 MIN: CPT | Mod: PBBFAC

## 2025-08-05 NOTE — PROGRESS NOTES
"Smita Nguyen is a 24 y.o. female  presents for a postpartum visit.  She is status post  6 weeks ago. Anesthesia: epidural. .  Her hospitalization was not complicated.  She is breastfeeding.  She desires IUD for contraception.  She denies postpartum depression.    Delivery Date:  6/3/2025  Delivery MD:  Dr. Story  Gender:  female  Baby Name:  "Holli "  Birth Weight:  6 lb 0.3 oz  Work plans after contraception:  Return to work  Breast Feeding: yes  Vaginal Bleeding: stopped 2 weeks PP  Incontinence: No  Depression: No  [place] yet: No   Contraception discussed -pt would like mirena IUD  Support system: Good.  Her last pap was  (done today)    Past Medical History:   Diagnosis Date    Anxiety     Nicotine dependence     vapes    Thyroid condition     mom not sure but has "thyroid problem", was on med but hasn't refilled , not sure of name of med     Past Surgical History:   Procedure Laterality Date    APPENDECTOMY      REPAIR OF LACERATION      URETEROSCOPIC REMOVAL OF URETERIC CALCULUS Right 2023    Procedure: Cystoscopy, retrograde pyelogram, ureteroscopy with laser lithotripsy, placement of ureteral stent;  Surgeon: Ly Floyd MD;  Location: [facility];  Service: Urology;  Laterality: Right;  RN PREOP 2023----[facility]----UPT     Review of patient's allergies indicates:   Allergen Reactions    Latex Itching     Current Medications[1]      Vitals:    25 0839   BP: 106/64       ABDOMEN: Soft. No tenderness or masses. No hepatosplenomegaly. No hernias.  BREASTS: exam deferred  PELVIC: External female genitalia without lesions, well-healed.  Female hair distribution. Adequate perineal body without evident defect, Normal urethral meatus. Vagina moist and well rugated without lesions or discharge. Cervix pink without lesions, discharge or tenderness. Uterus is 4-6 week size, regular, mobile and nontender. Adnexa without masses or tenderness.    Assessment:  Normal " postpartum exam    Plan:  Routine follow up.     1. Family planning (Primary)  - Device Authorization Order    2. Postpartum care and examination  - Liquid-Based Pap Smear, Screening     Discussed contraception - pt desires Mirena IUD - ordered and scheduled for insertion with Dr Story  Counseling regarding resuming normal activities of exercise and work.  Postpartum precautions reviewed  Routine follow up in 1 yr    I spent a total of 20 minutes on the day of the visit. This includes face to face time and non-face to face time preparing to see the patient (eg, review of tests and charts), Obtaining and/or reviewing separately obtained history, Documenting clinical information in the electronic or other health record, Independently interpreting results and communicating results to the patient/family/caregiver, or Care coordination.          [1]   Current Outpatient Medications:     acetaminophen (TYLENOL EXTRA STRENGTH) 500 MG tablet, Take 2 tablets (1,000 mg total) by mouth every 6 (six) hours as needed for Pain. (Patient not taking: Reported on 8/5/2025), Disp: 100 tablet, Rfl: 2    cephALEXin (KEFLEX) 500 MG capsule, Take 1 capsule (500 mg total) by mouth every 12 (twelve) hours., Disp: 4 capsule, Rfl: 0    docusate sodium (COLACE) 100 MG capsule, Take 1 capsule (100 mg total) by mouth 2 (two) times daily. (Patient not taking: Reported on 8/5/2025), Disp: 60 capsule, Rfl: 1    famotidine (PEPCID) 20 MG tablet, Take 1 tablet (20 mg total) by mouth 2 (two) times daily. (Patient not taking: Reported on 8/5/2025), Disp: 60 tablet, Rfl: 4    ibuprofen (ADVIL,MOTRIN) 800 MG tablet, Take 1 tablet (800 mg total) by mouth every 8 (eight) hours as needed for Pain. (Patient not taking: Reported on 8/5/2025), Disp: 60 tablet, Rfl: 2    phenazopyridine (PYRIDIUM) 100 MG tablet, Take 2 tablets (200 mg total) by mouth 3 (three) times daily with meals., Disp: 18 tablet, Rfl: 0    PNV,calcium 72/iron/folic acid (PRENATAL  VITAMIN) Tab, Take 1 tablet by mouth once daily. (Patient not taking: Reported on 8/5/2025), Disp: 90 tablet, Rfl: 4    promethazine (PHENERGAN) 25 MG tablet, Take 1 tablet (25 mg total) by mouth every 4 (four) hours. (Patient not taking: Reported on 8/5/2025), Disp: 30 tablet, Rfl: 4

## 2025-08-08 LAB
HPV DNA, HIGH RISK TYPE 16, PCR (OHS): NEGATIVE
HPV DNA, HIGH RISK TYPE 18, PCR (OHS): NEGATIVE
HPV DNA, HIGH RISK TYPE OTHER, PCR (OHS): POSITIVE
INSULIN SERPL-ACNC: NORMAL U[IU]/ML
LAB AP BETHESDA CATEGORY: NORMAL
LAB AP CLINICAL FINDINGS: NORMAL
LAB AP CONTRACEPTIVES: NORMAL
LAB AP GYN ADDITIONAL FINDINGS: NORMAL
LAB AP LMP DATE: NORMAL
LAB AP OCHS PAP SPECIMEN ADEQUACY: NORMAL
LAB AP OHS PAP INTERPRETATION: NORMAL
LAB AP PAP DISCLAIMER COMMENTS: NORMAL
LAB AP PAP ESTROGEN REPLACEMENT THERAPY: NORMAL
LAB AP PAP PMP: NORMAL
LAB AP PAP PREVIOUS BX: NORMAL
LAB AP PAP PRIOR TREATMENT: NORMAL
LAB AP PERFORMING LOCATION(S): NORMAL

## 2025-08-11 ENCOUNTER — RESULTS FOLLOW-UP (OUTPATIENT)
Dept: OBSTETRICS AND GYNECOLOGY | Facility: CLINIC | Age: 25
End: 2025-08-11
Payer: MEDICAID

## (undated) DEVICE — SOL NACL IRR 3000ML

## (undated) DEVICE — BAG PRESSURE INFUSER 3000CC

## (undated) DEVICE — GUIDEWIRE ZIPWIRE .035 D 150CM

## (undated) DEVICE — CONTAINER SPECIMEN STRL 4OZ

## (undated) DEVICE — MAT QUICK 40X30 FLOOR FLUID LF

## (undated) DEVICE — BLANKET UPPER BODY 78.7X29.9IN

## (undated) DEVICE — COVER SNAP KAP 26IN

## (undated) DEVICE — GLOVE BIOGEL PI MICRO SZ 7

## (undated) DEVICE — GLOVE SURGICAL LATEX SZ 6.5

## (undated) DEVICE — SYR ONLY LUER LOCK 20CC

## (undated) DEVICE — SHEATH NAVIGATOR HD 11/13 36

## (undated) DEVICE — SEE MEDLINE ITEM 157110

## (undated) DEVICE — FIBER MOSES 200 DFL

## (undated) DEVICE — ADAPT UROLOGICAL 2-WAY STOPCK

## (undated) DEVICE — SENSOR DUAL FLEX STR 150CM

## (undated) DEVICE — BASKET STONE RETRV 1.9FRX120CM

## (undated) DEVICE — Device

## (undated) DEVICE — CANISTER SUCTION 2 LTR

## (undated) DEVICE — SUPPORT ULNA NERVE PROTECTOR

## (undated) DEVICE — GAUZE SPONGE 4X4 12PLY